# Patient Record
Sex: FEMALE | NOT HISPANIC OR LATINO | Employment: FULL TIME | URBAN - METROPOLITAN AREA
[De-identification: names, ages, dates, MRNs, and addresses within clinical notes are randomized per-mention and may not be internally consistent; named-entity substitution may affect disease eponyms.]

---

## 2017-01-12 DIAGNOSIS — M54.40 CHRONIC LOW BACK PAIN WITH SCIATICA, SCIATICA LATERALITY UNSPECIFIED, UNSPECIFIED BACK PAIN LATERALITY: ICD-10-CM

## 2017-01-12 DIAGNOSIS — G89.29 CHRONIC LOW BACK PAIN WITH SCIATICA, SCIATICA LATERALITY UNSPECIFIED, UNSPECIFIED BACK PAIN LATERALITY: ICD-10-CM

## 2017-01-12 RX ORDER — HYDROCODONE BITARTRATE AND ACETAMINOPHEN 10; 325 MG/1; MG/1
1 TABLET ORAL EVERY 8 HOURS PRN
Qty: 45 TAB | Refills: 0 | OUTPATIENT
Start: 2017-01-12

## 2017-01-13 ENCOUNTER — OFFICE VISIT (OUTPATIENT)
Dept: MEDICAL GROUP | Facility: MEDICAL CENTER | Age: 62
End: 2017-01-13
Attending: FAMILY MEDICINE
Payer: MEDICAID

## 2017-01-13 VITALS
TEMPERATURE: 98.9 F | DIASTOLIC BLOOD PRESSURE: 80 MMHG | HEART RATE: 92 BPM | WEIGHT: 190 LBS | OXYGEN SATURATION: 94 % | BODY MASS INDEX: 32.44 KG/M2 | HEIGHT: 64 IN | SYSTOLIC BLOOD PRESSURE: 132 MMHG | RESPIRATION RATE: 16 BRPM

## 2017-01-13 DIAGNOSIS — G89.29 CHRONIC LOW BACK PAIN WITHOUT SCIATICA, UNSPECIFIED BACK PAIN LATERALITY: ICD-10-CM

## 2017-01-13 DIAGNOSIS — I10 ESSENTIAL HYPERTENSION: ICD-10-CM

## 2017-01-13 DIAGNOSIS — Z12.11 SCREEN FOR COLON CANCER: ICD-10-CM

## 2017-01-13 DIAGNOSIS — M54.40 CHRONIC LOW BACK PAIN WITH SCIATICA, SCIATICA LATERALITY UNSPECIFIED, UNSPECIFIED BACK PAIN LATERALITY: ICD-10-CM

## 2017-01-13 DIAGNOSIS — R06.02 SHORTNESS OF BREATH: ICD-10-CM

## 2017-01-13 DIAGNOSIS — E66.9 OBESITY (BMI 30-39.9): ICD-10-CM

## 2017-01-13 DIAGNOSIS — Z12.31 ENCOUNTER FOR SCREENING MAMMOGRAM FOR MALIGNANT NEOPLASM OF BREAST: ICD-10-CM

## 2017-01-13 DIAGNOSIS — F41.9 ANXIETY: ICD-10-CM

## 2017-01-13 DIAGNOSIS — M54.50 CHRONIC LOW BACK PAIN WITHOUT SCIATICA, UNSPECIFIED BACK PAIN LATERALITY: ICD-10-CM

## 2017-01-13 DIAGNOSIS — G89.29 CHRONIC LOW BACK PAIN WITH SCIATICA, SCIATICA LATERALITY UNSPECIFIED, UNSPECIFIED BACK PAIN LATERALITY: ICD-10-CM

## 2017-01-13 PROCEDURE — 99213 OFFICE O/P EST LOW 20 MIN: CPT | Performed by: FAMILY MEDICINE

## 2017-01-13 PROCEDURE — 99214 OFFICE O/P EST MOD 30 MIN: CPT | Performed by: FAMILY MEDICINE

## 2017-01-13 RX ORDER — HYDROCODONE BITARTRATE AND ACETAMINOPHEN 10; 325 MG/1; MG/1
1 TABLET ORAL EVERY 8 HOURS PRN
Qty: 45 TAB | Refills: 0 | Status: SHIPPED | OUTPATIENT
Start: 2017-01-13 | End: 2017-02-17 | Stop reason: SDUPTHER

## 2017-01-13 ASSESSMENT — PATIENT HEALTH QUESTIONNAIRE - PHQ9: CLINICAL INTERPRETATION OF PHQ2 SCORE: 0

## 2017-01-13 NOTE — MR AVS SNAPSHOT
"        Susan Pastrana   2017 1:50 PM   Office Visit   MRN: [id number]    Department:  [facility]   Dept Phone:  [phone]    Description:  Female : 1955   Provider:  Yoan Valenzuela M.D.           Reason for Visit     Back Pain           Allergies as of 2017     No Known Allergies      You were diagnosed with     Obesity (BMI 30-39.9)   [996743]       Screen for colon cancer   [701000]       Encounter for screening mammogram for malignant neoplasm of breast   [606558]       Chronic low back pain without sciatica, unspecified back pain laterality   [5761579]       Essential hypertension   [3152981]       Shortness of breath   [786.05.ICD-9-CM]       Chronic low back pain with sciatica, sciatica laterality unspecified, unspecified back pain laterality   [8195229]         Vital Signs     Blood Pressure Pulse Temperature Respirations Height Weight    132/80 mmHg 92 37.2 °C (98.9 °F) 16 1.626 m (5' 4.02\") 86.183 kg (190 lb)    Body Mass Index Oxygen Saturation Last Menstrual Period Breastfeeding? Smoking Status       32.60 kg/m2 94% 2007 No Current Every Day Smoker       Basic Information     Date Of Birth Sex Race Ethnicity Preferred Language    1955 Female Unknown Non- English      Your appointments     2017  1:50 PM   Established Patient with Yoan Valenzuela M.D.   [facility] ([facility])    [address] [place] [id number]   [phone]           You will be receiving a confirmation call a few days before your appointment from our automated call confirmation system.              Problem List              ICD-10-CM Priority Class Noted - Resolved    Anxiety F41.9   10/21/2014 - Present    Acute right flank pain R10.9   11/3/2014 - Present    Elevated liver enzymes R74.8   2014 - Present    Fatty liver K76.0   2014 - Present    Chronic low back pain M54.5, G89.29   2014 - Present    Essential hypertension I10   2015 - " Present    Dyspnea R06.00   9/9/2016 - Present      Health Maintenance        Date Due Completion Dates    IMM DTaP/Tdap/Td Vaccine (1 - Tdap) 4/18/1976 ---    PAP SMEAR 4/18/1978 ---    MAMMOGRAM 4/18/1997 ---    COLONOSCOPY 4/18/2007 ---            Current Immunizations     Influenza Vaccine Quad Inj (Pf) 12/8/2015    Influenza Vaccine Quad Inj (Preserved) 10/13/2016    Tuberculin Skin Test 10/13/2016      Below and/or attached are the medications your provider expects you to take. Review all of your home medications and newly ordered medications with your provider and/or pharmacist. Follow medication instructions as directed by your provider and/or pharmacist. Please keep your medication list with you and share with your provider. Update the information when medications are discontinued, doses are changed, or new medications (including over-the-counter products) are added; and carry medication information at all times in the event of emergency situations     Allergies:  No Known Allergies          Medications  Valid as of: January 13, 2017 -  3:03 PM    Generic Name Brand Name Tablet Size Instructions for use    Albuterol Sulfate (Aero Soln) VENTOLIN  (90 BASE) MCG/ACT INHALE 2 PUFFS ORALLY EVERY 6 HOURS IF NEEDED FOR SHORTNESS OF BREATH        Chlorothiazide (Tab) DIURIL 500 MG TAKE 1 TABLET ORALLY EVERY MORNING        Cyclobenzaprine HCl (Tab) FLEXERIL 10 MG Take 1 Tab by mouth 3 times a day as needed. PAIN        Cyclobenzaprine HCl (Tab) FLEXERIL 10 MG TAKE 1 TABLET ORALLY 3 TIMES DAILY IF NEEDED FOR PAIN        Hydrocodone-Acetaminophen (Tab) NORCO  MG Take 1 Tab by mouth every 8 hours as needed.        Metoprolol Succinate (TABLET SR 24 HR) TOPROL XL 25 MG TAKE 1 TABLET ORALLY ONCE DAILY        Sertraline HCl (Tab) ZOLOFT 50 MG Take 1 Tab by mouth every day.        SUMAtriptan Succinate (Tab) IMITREX 50 MG Take 1 Tab by mouth Once PRN for Migraine.        Umeclidinium-Vilanterol (AEROSOL  POWDER, BREATH ACTIVATED) Umeclidinium-Vilanterol 62.5-25 MCG/INH Inhale 1 Puff by mouth every day.        .                 Medicines prescribed today were sent to:     [facility] - [place], [place] - [address]    [address] [facility] [place] [address]    Phone: [phone] Fax: [phone]    Open 24 Hours?: No    [facility] - [place], [place] - [address]    [address] [place] [address]    Phone: [phone] Fax: [phone]    Open 24 Hours?: No      Medication refill instructions:       If your prescription bottle indicates you have medication refills left, it is not necessary to call your provider’s office. Please contact your pharmacy and they will refill your medication.    If your prescription bottle indicates you do not have any refills left, you may request refills at any time through one of the following ways: The online [employer] system (except Urgent Care), by calling your provider’s office, or by asking your pharmacy to contact your provider’s office with a refill request. Medication refills are processed only during regular business hours and may not be available until the next business day. Your provider may request additional information or to have a follow-up visit with you prior to refilling your medication.   *Please Note: Medication refills are assigned a new Rx number when refilled electronically. Your pharmacy may indicate that no refills were authorized even though a new prescription for the same medication is available at the pharmacy. Please request the medicine by name with the pharmacy before contacting your provider for a refill.        Referral     A referral request has been sent to our patient care coordination department. Please allow 3-5 business days for us to process this request and contact you either by phone or mail. If you do not hear from us by the 5th business day, please call us at [phone].           [employer] Access Code:  [email or web address]  Expires: 2/12/2017  3:03 PM    [facility]  A secure, online tool to manage your health information     [employer] [facility]® is a secure, online tool that connects you to your personalized health information from the privacy of your home -- day or night - making it very easy for you to manage your healthcare. Once the activation process is completed, you can even access your medical information using the [facility] leodan, which is available for free in the Apple Leodan store or Google Play store.     [facility] provides the following levels of access (as shown below):   My Chart Features   [facility] Primary Care Doctor [facility]  Specialists [facility]  Urgent  Care Non-[facility]  Primary Care  Doctor   Email your healthcare team securely and privately 24/7 X X X    Manage appointments: schedule your next appointment; view details of past/upcoming appointments X      Request prescription refills. X      View recent personal medical records, including lab and immunizations X X X X   View health record, including health history, allergies, medications X X X X   Read reports about your outpatient visits, procedures, consult and ER notes X X X X   See your discharge summary, which is a recap of your hospital and/or ER visit that includes your diagnosis, lab results, and care plan. X X       How to register for [facility]:  1. Go to  [email or web address].  2. Click on the Sign Up Now box, which takes you to the New Member Sign Up page. You will need to provide the following information:  a. Enter your [facility] Access Code exactly as it appears at the top of this page. (You will not need to use this code after you’ve completed the sign-up process. If you do not sign up before the expiration date, you must request a new code.)   b. Enter your date of birth.   c. Enter your home email address.   d. Click Submit, and follow the next screen’s instructions.  3. Create a [facility] ID. This will be your [facility] login ID and cannot be  changed, so think of one that is secure and easy to remember.  4. Create a [employer] password. You can change your password at any time.  5. Enter your Password Reset Question and Answer. This can be used at a later time if you forget your password.   6. Enter your e-mail address. This allows you to receive e-mail notifications when new information is available in [facility].  7. Click Sign Up. You can now view your health information.    For assistance activating your [employer] account, call [phone]

## 2017-01-14 NOTE — ASSESSMENT & PLAN NOTE
Patient notes recurrent episodes of low back pain typically prompting her to take at least one Norco 10 mg daily, on occasion too. She is not currently doing any significant lifting. She reports no radiation of pain down either leg and no urinary or fecal incontinence.

## 2017-01-14 NOTE — ASSESSMENT & PLAN NOTE
Patient notes increased feelings of anxiety and intermittent depression recently. She reports she has been takingProzac 20 mg recently increased to 40 mg but recently without as much benefit as she enjoyed in the past. She denies suicidal ideation or near syncope.

## 2017-01-14 NOTE — ASSESSMENT & PLAN NOTE
Patient notes more frequent wheezes each morning as she wakes up and at times at bedtime as well. She tried Spiriva for 2 weeks but noticed that it gave her persistent pressure feeling in her bladder area which subsided quickly after she stopped the Spiriva. She denies any wheezes, and only has a rare dry cough.

## 2017-01-14 NOTE — PROGRESS NOTES
Chief Complaint   Patient presents with   • Back Pain       HISTORY OF PRESENT ILLNESS: Patient is a 61 y.o. female established patient who presents today to follow-up on anxiety/depression, worsening dyspnea, persistent lumbar pain        Anxiety  Patient notes increased feelings of anxiety and intermittent depression recently. She reports she has been takingProzac 20 mg recently increased to 40 mg but recently without as much benefit as she enjoyed in the past. She denies suicidal ideation or near syncope.    Dyspnea  Patient notes more frequent wheezes each morning as she wakes up and at times at bedtime as well. She tried Spiriva for 2 weeks but noticed that it gave her persistent pressure feeling in her bladder area which subsided quickly after she stopped the Spiriva. She denies any wheezes, and only has a rare dry cough.    Chronic low back pain  Patient notes recurrent episodes of low back pain typically prompting her to take at least one Norco 10 mg daily, on occasion too. She is not currently doing any significant lifting. She reports no radiation of pain down either leg and no urinary or fecal incontinence.    Obesity   Patient admits she has been overeating through the holidays. She denies any hair loss or cold intolerance.  Patient Active Problem List    Diagnosis Date Noted   • Dyspnea 09/09/2016   • Essential hypertension 12/08/2015   • Elevated liver enzymes 12/01/2014   • Fatty liver 12/01/2014   • Chronic low back pain 12/01/2014   • Acute right flank pain 11/03/2014   • Anxiety 10/21/2014       Allergies:Review of patient's allergies indicates no known allergies.    Current Outpatient Prescriptions   Medication Sig Dispense Refill   • Umeclidinium-Vilanterol (ANORO ELLIPTA) 62.5-25 MCG/INH AEROSOL POWDER, BREATH ACTIVATED Inhale 1 Puff by mouth every day. 1 Each 6   • sertraline (ZOLOFT) 50 MG Tab Take 1 Tab by mouth every day. 30 Tab 11   • hydrocodone/acetaminophen (NORCO)  MG Tab Take 1  "Tab by mouth every 8 hours as needed. 45 Tab 0   • metoprolol SR (TOPROL XL) 25 MG TABLET SR 24 HR TAKE 1 TABLET ORALLY ONCE DAILY 30 Tab 10   • VENTOLIN  (90 BASE) MCG/ACT Aero Soln inhalation aerosol INHALE 2 PUFFS ORALLY EVERY 6 HOURS IF NEEDED FOR SHORTNESS OF BREATH 1 Inhaler 2   • chlorothiazide (DIURIL) 500 MG tablet TAKE 1 TABLET ORALLY EVERY MORNING 30 Tab 6   • cyclobenzaprine (FLEXERIL) 10 MG Tab TAKE 1 TABLET ORALLY 3 TIMES DAILY IF NEEDED FOR PAIN 60 Tab 6   • cyclobenzaprine (FLEXERIL) 10 MG Tab Take 1 Tab by mouth 3 times a day as needed. PAIN 60 Tab 4   • sumatriptan (IMITREX) 50 MG TABS Take 1 Tab by mouth Once PRN for Migraine. 5 Each 11     No current facility-administered medications for this visit.    Social History-, not working    Social History   Substance Use Topics   • Smoking status: Current Every Day Smoker -- 0.25 packs/day     Types: Cigarettes   • Smokeless tobacco: Never Used   • Alcohol Use: 0.0 oz/week     0 Standard drinks or equivalent per week      Comment: occ       Family History   Problem Relation Age of Onset   • Cancer Mother      stomach   • Diabetes Father    • Cancer Father      prostate   • Diabetes Paternal Grandmother    • Heart Disease Paternal Grandmother    • Stroke Neg Hx        ROS:  Review of Systems   Constitutional: Negative for fever, chills, weight loss and malaise/fatigue.   Eyes: Negative for blurred vision.   Respiratory: Negative for cough, sputum production, shortness of breath and wheezing.    Cardiovascular: Negative for chest pain, palpitations, orthopnea and leg swelling.   Gastrointestinal: Negative for heartburn, nausea, vomiting and abdominal pain. .   Endo/Heme/Allergies: Does not bruise/bleed easily.               Exam:  Blood pressure 132/80, pulse 92, temperature 37.2 °C (98.9 °F), resp. rate 16, height 1.626 m (5' 4.02\"), weight 86.183 kg (190 lb), last menstrual period 09/13/2007, SpO2 94 %, not currently " breastfeeding.  General:  Well nourished, well developed female in NAD  Head is grossly normal.  Neck: Supple without JVD or bruit. Thyroid is not enlarged. Trachea is midline.  Pulmonary: Clear to ausculation .  Normal effort. No rales, ronchi, or wheezing.  Cardiovascular: Regular rate and rhythm without murmur.  Abdomen-Abdomen is soft, normal bowel sounds, no masses, guarding, ororganomegaly, or tenderness.  Lower extremities- neg for pretibial edema, redness, tenderness. Negative straight leg raising in the sitting position.   Psych-normal affect with good eye contact. Normal grooming. Oriented x4.      Please note that this dictation was created using voice recognition software. I have made every reasonable attempt to correct obvious errors, but I expect that there are errors of grammar and possibly content that I did not discover before finalizing the note.    Assessment/Plan:  1. Obesity (BMI 30-39.9)  Patient identified as having weight management issue.  Appropriate orders and counseling given.   2. Screen for colon cancer  REFERRAL TO GI FOR COLONOSCOPY   3. Encounter for screening mammogram for malignant neoplasm of breast  MA-SCREEN MAMMO W/CAD-BILAT   4. Chronic low back pain without sciatica, unspecified back pain laterality     5. Essential hypertension     6. Shortness of breath     7. Chronic low back pain with sciatica, sciatica laterality unspecified, unspecified back pain laterality  hydrocodone/acetaminophen (NORCO)  MG Tab   8. Anxiety        Plan: 1. Taper off fluoxetine 40 mg over the next 1 week.  2. Begin sertraline 50 mg daily at bedtime in 1 week  3. Trial of Anoro 1 puff daily in place of Spiriva which has been discontinued  4. Renew Norco, 10 mg, #45  5. Revisit in one month

## 2017-02-10 DIAGNOSIS — G89.29 CHRONIC LOW BACK PAIN WITH SCIATICA, SCIATICA LATERALITY UNSPECIFIED, UNSPECIFIED BACK PAIN LATERALITY: ICD-10-CM

## 2017-02-10 DIAGNOSIS — M54.40 CHRONIC LOW BACK PAIN WITH SCIATICA, SCIATICA LATERALITY UNSPECIFIED, UNSPECIFIED BACK PAIN LATERALITY: ICD-10-CM

## 2017-02-10 RX ORDER — HYDROCODONE BITARTRATE AND ACETAMINOPHEN 10; 325 MG/1; MG/1
1 TABLET ORAL EVERY 8 HOURS PRN
Qty: 45 TAB | Refills: 0 | OUTPATIENT
Start: 2017-02-10

## 2017-02-13 DIAGNOSIS — M54.40 CHRONIC LOW BACK PAIN WITH SCIATICA, SCIATICA LATERALITY UNSPECIFIED, UNSPECIFIED BACK PAIN LATERALITY: ICD-10-CM

## 2017-02-13 DIAGNOSIS — G89.29 CHRONIC LOW BACK PAIN WITH SCIATICA, SCIATICA LATERALITY UNSPECIFIED, UNSPECIFIED BACK PAIN LATERALITY: ICD-10-CM

## 2017-02-13 RX ORDER — HYDROCODONE BITARTRATE AND ACETAMINOPHEN 10; 325 MG/1; MG/1
1 TABLET ORAL EVERY 8 HOURS PRN
Qty: 45 TAB | Refills: 0 | OUTPATIENT
Start: 2017-02-13

## 2017-02-13 RX ORDER — FLUOXETINE HYDROCHLORIDE 20 MG/1
CAPSULE ORAL
Qty: 60 CAP | Refills: 1 | Status: SHIPPED | OUTPATIENT
Start: 2017-02-13 | End: 2017-02-17

## 2017-02-14 NOTE — TELEPHONE ENCOUNTER
Pt had an appointment scheduled for today, she had to cancel due to an appointment regarding a possible job. She wants to reschedule after she knows what her schedule will be.     Was the patient seen in the last year in this department? Yes     Does patient have an active prescription for medications requested? No     Received Request Via: Pharmacy

## 2017-02-17 ENCOUNTER — HOSPITAL ENCOUNTER (OUTPATIENT)
Facility: MEDICAL CENTER | Age: 62
End: 2017-02-17
Attending: FAMILY MEDICINE
Payer: MEDICAID

## 2017-02-17 ENCOUNTER — OFFICE VISIT (OUTPATIENT)
Dept: MEDICAL GROUP | Facility: MEDICAL CENTER | Age: 62
End: 2017-02-17
Attending: FAMILY MEDICINE
Payer: MEDICAID

## 2017-02-17 VITALS
RESPIRATION RATE: 16 BRPM | OXYGEN SATURATION: 89 % | DIASTOLIC BLOOD PRESSURE: 78 MMHG | HEIGHT: 64 IN | WEIGHT: 189 LBS | SYSTOLIC BLOOD PRESSURE: 126 MMHG | BODY MASS INDEX: 32.27 KG/M2 | TEMPERATURE: 98.8 F | HEART RATE: 100 BPM

## 2017-02-17 DIAGNOSIS — F41.9 ANXIETY: ICD-10-CM

## 2017-02-17 DIAGNOSIS — M54.40 CHRONIC LOW BACK PAIN WITH SCIATICA, SCIATICA LATERALITY UNSPECIFIED, UNSPECIFIED BACK PAIN LATERALITY: ICD-10-CM

## 2017-02-17 DIAGNOSIS — Z12.31 ENCOUNTER FOR SCREENING MAMMOGRAM FOR MALIGNANT NEOPLASM OF BREAST: ICD-10-CM

## 2017-02-17 DIAGNOSIS — G89.29 CHRONIC LOW BACK PAIN WITH SCIATICA, SCIATICA LATERALITY UNSPECIFIED, UNSPECIFIED BACK PAIN LATERALITY: ICD-10-CM

## 2017-02-17 DIAGNOSIS — Z79.891 CHRONIC USE OF OPIATE DRUGS THERAPEUTIC PURPOSES: ICD-10-CM

## 2017-02-17 DIAGNOSIS — J44.9 CHRONIC OBSTRUCTIVE PULMONARY DISEASE, UNSPECIFIED COPD TYPE (HCC): ICD-10-CM

## 2017-02-17 PROCEDURE — 99214 OFFICE O/P EST MOD 30 MIN: CPT | Performed by: FAMILY MEDICINE

## 2017-02-17 PROCEDURE — G0480 DRUG TEST DEF 1-7 CLASSES: HCPCS

## 2017-02-17 PROCEDURE — 80307 DRUG TEST PRSMV CHEM ANLYZR: CPT

## 2017-02-17 RX ORDER — SERTRALINE HYDROCHLORIDE 100 MG/1
100 TABLET, FILM COATED ORAL DAILY
Qty: 30 TAB | Refills: 6 | Status: SHIPPED | OUTPATIENT
Start: 2017-02-17 | End: 2017-03-17

## 2017-02-17 RX ORDER — ALBUTEROL SULFATE 90 UG/1
AEROSOL, METERED RESPIRATORY (INHALATION)
Refills: 1 | Status: CANCELLED | OUTPATIENT
Start: 2017-02-17

## 2017-02-17 RX ORDER — HYDROCODONE BITARTRATE AND ACETAMINOPHEN 10; 325 MG/1; MG/1
1 TABLET ORAL EVERY 8 HOURS PRN
Qty: 45 TAB | Refills: 0 | Status: SHIPPED | OUTPATIENT
Start: 2017-02-17 | End: 2017-03-17 | Stop reason: SDUPTHER

## 2017-02-17 RX ORDER — ALBUTEROL SULFATE 90 UG/1
AEROSOL, METERED RESPIRATORY (INHALATION)
Qty: 1 INHALER | Refills: 5 | Status: SHIPPED | OUTPATIENT
Start: 2017-02-17 | End: 2017-08-21 | Stop reason: SDUPTHER

## 2017-02-17 ASSESSMENT — ENCOUNTER SYMPTOMS: DEPRESSION: 0

## 2017-02-17 ASSESSMENT — LIFESTYLE VARIABLES: HISTORY_ALCOHOL_USE: 0

## 2017-02-17 NOTE — MR AVS SNAPSHOT
"        Susan Pastrana   2017 4:50 PM   Office Visit   MRN: [id number]    [facility]   Dept Phone:  [phone]    Description:  Female : 1955   Provider:  Yoan Valenzuela M.D.           Reason for Visit     Back Pain           Allergies as of 2017     No Known Allergies      You were diagnosed with     Encounter for screening mammogram for malignant neoplasm of breast   [038341]       Chronic use of opiate drugs therapeutic purposes   [5037777]       Chronic low back pain with sciatica, sciatica laterality unspecified, unspecified back pain laterality   [[id number]]         Vital Signs     Blood Pressure Pulse Temperature Respirations Height Weight    126/78 mmHg 100 37.1 °C (98.8 °F) 16 1.626 m (5' 4.02\") 85.73 kg (189 lb)    Body Mass Index Oxygen Saturation Last Menstrual Period Breastfeeding? Smoking Status       32.43 kg/m2 89% 2007 No Current Every Day Smoker       Basic Information     Date Of Birth Sex Race Ethnicity Preferred Language    1955 Female Unknown Non- English      Problem List              ICD-10-CM Priority Class Noted - Resolved    Anxiety F41.9   10/21/2014 - Present    Acute right flank pain R10.9   11/3/2014 - Present    Elevated liver enzymes R74.8   2014 - Present    Fatty liver K76.0   2014 - Present    Chronic low back pain M54.5, G89.29   2014 - Present    Essential hypertension I10   2015 - Present    Dyspnea R06.00   2016 - Present    Chronic use of opiate drugs therapeutic purposes Z79.899   2017 - Present      Health Maintenance        Date Due Completion Dates    IMM DTaP/Tdap/Td Vaccine (1 - Tdap) 1976 ---    PAP SMEAR 1978 ---    MAMMOGRAM 1997 ---    COLONOSCOPY 2007 ---            Current Immunizations     Influenza Vaccine Quad Inj (Pf) 2015    Influenza Vaccine Quad Inj (Preserved) 10/13/2016    Tuberculin Skin Test 10/13/2016      Below and/or attached are the " medications your provider expects you to take. Review all of your home medications and newly ordered medications with your provider and/or pharmacist. Follow medication instructions as directed by your provider and/or pharmacist. Please keep your medication list with you and share with your provider. Update the information when medications are discontinued, doses are changed, or new medications (including over-the-counter products) are added; and carry medication information at all times in the event of emergency situations     Allergies:  No Known Allergies          Medications  Valid as of: February 17, 2017 -  5:41 PM    Generic Name Brand Name Tablet Size Instructions for use    Albuterol Sulfate (Aero Soln) albuterol 108 (90 BASE) MCG/ACT INHALE 2 PUFFS ORALLY EVERY 6 HOURS IF NEEDED FOR SHORTNESS OF BREATH        Chlorothiazide (Tab) DIURIL 500 MG TAKE 1 TABLET ORALLY EVERY MORNING        Cyclobenzaprine HCl (Tab) FLEXERIL 10 MG Take 1 Tab by mouth 3 times a day as needed. PAIN        Cyclobenzaprine HCl (Tab) FLEXERIL 10 MG TAKE 1 TABLET ORALLY 3 TIMES DAILY IF NEEDED FOR PAIN        Hydrocodone-Acetaminophen (Tab) NORCO  MG Take 1 Tab by mouth every 8 hours as needed.        Metoprolol Succinate (TABLET SR 24 HR) TOPROL XL 25 MG TAKE 1 TABLET ORALLY ONCE DAILY        Sertraline HCl (Tab) ZOLOFT 50 MG Take 1 Tab by mouth every day.        Sertraline HCl (Tab) ZOLOFT 100 MG Take 1 Tab by mouth every day.        SUMAtriptan Succinate (Tab) IMITREX 50 MG Take 1 Tab by mouth Once PRN for Migraine.        Umeclidinium-Vilanterol (AEROSOL POWDER, BREATH ACTIVATED) Umeclidinium-Vilanterol 62.5-25 MCG/INH Inhale 1 Puff by mouth every day.        .                 Medicines prescribed today were sent to:     [facility]  [place] - [address] [facility].    [address] [place] [id number]    Phone: [phone] Fax: [phone]    Open 24 Hours?: No    [facility] [place] [address]    [address] [place] [address]    Phone: [phone] Fax: [phone]    Open 24 Hours?: No      Medication refill instructions:       If your prescription bottle indicates you have medication refills left, it is not necessary to call your provider’s office. Please contact your pharmacy and they will refill your medication.    If your prescription bottle indicates you do not have any refills left, you may request refills at any time through one of the following ways: The online Pebble system (except Urgent Care), by calling your provider’s office, or by asking your pharmacy to contact your provider’s office with a refill request. Medication refills are processed only during regular business hours and may not be available until the next business day. Your provider may request additional information or to have a follow-up visit with you prior to refilling your medication.   *Please Note: Medication refills are assigned a new Rx number when refilled electronically. Your pharmacy may indicate that no refills were authorized even though a new prescription for the same medication is available at the pharmacy. Please request the medicine by name with the pharmacy before contacting your provider for a refill.        Your To Do List     Future Labs/Procedures Complete By Expires    PAIN MANAGEMENT PANEL, SCRN W/ RFLX TO QNT  As directed 2/17/2018    Comments:    Current Meds (name, sig, last dose):   Current outpatient prescriptions:   •  fluoxetine, TAKE 2 CAPSULES ORALLY ONCE DAILY  •  Umeclidinium-Vilanterol, 1 Puff, Inhalation, DAILY  •  sertraline, 50 mg, Oral, DAILY  •  hydrocodone/acetaminophen, 1 Tab, Oral, Q8HRS PRN  •  metoprolol SR, TAKE 1 TABLET ORALLY ONCE DAILY  •  VENTOLIN HFA, INHALE 2 PUFFS ORALLY EVERY 6 HOURS IF NEEDED FOR SHORTNESS OF BREATH  •  chlorothiazide, TAKE 1 TABLET ORALLY EVERY MORNING  •  cyclobenzaprine, TAKE 1 TABLET ORALLY 3 TIMES DAILY IF NEEDED FOR PAIN  •   cyclobenzaprine, 10 mg, Oral, TID PRN  •  sumatriptan, 50 mg, Oral, Once PRN, prn               WiTricity Access Code: [email or web address]  Expires: 3/19/2017  5:41 PM    WiTricity  A secure, online tool to manage your health information     [employer] WiTricity® is a secure, online tool that connects you to your personalized health information from the privacy of your home -- day or night - making it very easy for you to manage your healthcare. Once the activation process is completed, you can even access your medical information using the WiTricity leodan, which is available for free in the Apple Leodan store or Google Play store.     WiTricity provides the following levels of access (as shown below):   My Chart Features   [facility] Doctor [facility]  Specialists [facility]  Urgent  Care Non-[facility]  Primary Care  Doctor   Email your healthcare team securely and privately 24/7 X X X    Manage appointments: schedule your next appointment; view details of past/upcoming appointments X      Request prescription refills. X      View recent personal medical records, including lab and immunizations X X X X   View health record, including health history, allergies, medications X X X X   Read reports about your outpatient visits, procedures, consult and ER notes X X X X   See your discharge summary, which is a recap of your hospital and/or ER visit that includes your diagnosis, lab results, and care plan. X X       How to register for WiTricity:  1. Go to  [email or web address].  2. Click on the Sign Up Now box, which takes you to the New Member Sign Up page. You will need to provide the following information:  a. Enter your WiTricity Access Code exactly as it appears at the top of this page. (You will not need to use this code after you’ve completed the sign-up process. If you do not sign up before the expiration date, you must request a new code.)   b. Enter your date of birth.   c. Enter your home email address.   d. Click Submit,  and follow the next screen’s instructions.  3. Create a iNeoMarketing ID. This will be your iNeoMarketing login ID and cannot be changed, so think of one that is secure and easy to remember.  4. Create a [employer] password. You can change your password at any time.  5. Enter your Password Reset Question and Answer. This can be used at a later time if you forget your password.   6. Enter your e-mail address. This allows you to receive e-mail notifications when new information is available in iNeoMarketing.  7. Click Sign Up. You can now view your health information.    For assistance activating your iNeoMarketing account, call [phone]

## 2017-02-18 NOTE — ASSESSMENT & PLAN NOTE
Patient misunderstood and has continued 20 mg of fluoxetine daily along with 50 mg of sertraline beginning 3 weeks ago. She reports that she's noticed some mild lightheadedness and does not feel any real improvement with regard to anxiety. She denies suicidal ideation or tearfulness. Nose anxiety associated with job hunting and interviews.

## 2017-02-18 NOTE — ASSESSMENT & PLAN NOTE
Patient is currently trying to find work. She reports intermittent low back pain typically occurring on most days particularly if she spends more time up and walking. She is using Norco 10 mg 1-1/2 tablets per day on average. She denies constipation, urinary incontinence, fecal incontinence

## 2017-02-18 NOTE — ASSESSMENT & PLAN NOTE
She reports she has done well on starting with Anoro inhaler last month. She does not have the bladder/genital pressure/irritation that she reported linked with the Spiriva. Reports that she notes significantly less wheezing during nights and early mornings. She denies current cough or sputum production. She does smoke quarter pack of cigarettes per day.

## 2017-02-18 NOTE — PROGRESS NOTES
Chief Complaint   Patient presents with   • Back Pain       HISTORY OF PRESENT ILLNESS: Patient is a 61 y.o. female established patient who presents today to follow-up on COPD, chronic low back pain, anxiety        COPD (chronic obstructive pulmonary disease) (CMS-HCC)  She reports she has done well on starting with Anoro inhaler last month. She does not have the bladder/genital pressure/irritation that she reported linked with the Spiriva. Reports that she notes significantly less wheezing during nights and early mornings. She denies current cough or sputum production. She does smoke quarter pack of cigarettes per day.    Chronic low back pain  Patient is currently trying to find work. She reports intermittent low back pain typically occurring on most days particularly if she spends more time up and walking. She is using Norco 10 mg 1-1/2 tablets per day on average. She denies constipation, urinary incontinence, fecal incontinence    Anxiety  Patient misunderstood and has continued 20 mg of fluoxetine daily along with 50 mg of sertraline beginning 3 weeks ago. She reports that she's noticed some mild lightheadedness and does not feel any real improvement with regard to anxiety. She denies suicidal ideation or tearfulness. Nose anxiety associated with job hunting and interviews.      Patient Active Problem List    Diagnosis Date Noted   • Chronic use of opiate drugs therapeutic purposes 02/17/2017   • COPD (chronic obstructive pulmonary disease) (CMS-HCC) 02/17/2017   • Essential hypertension 12/08/2015   • Elevated liver enzymes 12/01/2014   • Fatty liver 12/01/2014   • Chronic low back pain 12/01/2014   • Acute right flank pain 11/03/2014   • Anxiety 10/21/2014    Social History-single, not currently working    Allergies:Review of patient's allergies indicates no known allergies.    Current Outpatient Prescriptions   Medication Sig Dispense Refill   • hydrocodone/acetaminophen (NORCO)  MG Tab Take 1 Tab by  mouth every 8 hours as needed. 45 Tab 0   • sertraline (ZOLOFT) 100 MG Tab Take 1 Tab by mouth every day. 30 Tab 6   • albuterol (VENTOLIN HFA) 108 (90 BASE) MCG/ACT Aero Soln inhalation aerosol INHALE 2 PUFFS ORALLY EVERY 6 HOURS IF NEEDED FOR SHORTNESS OF BREATH 1 Inhaler 5   • Umeclidinium-Vilanterol (ANORO ELLIPTA) 62.5-25 MCG/INH AEROSOL POWDER, BREATH ACTIVATED Inhale 1 Puff by mouth every day. 1 Each 6   • sertraline (ZOLOFT) 50 MG Tab Take 1 Tab by mouth every day. 30 Tab 11   • metoprolol SR (TOPROL XL) 25 MG TABLET SR 24 HR TAKE 1 TABLET ORALLY ONCE DAILY 30 Tab 10   • chlorothiazide (DIURIL) 500 MG tablet TAKE 1 TABLET ORALLY EVERY MORNING 30 Tab 6   • cyclobenzaprine (FLEXERIL) 10 MG Tab TAKE 1 TABLET ORALLY 3 TIMES DAILY IF NEEDED FOR PAIN 60 Tab 6   • cyclobenzaprine (FLEXERIL) 10 MG Tab Take 1 Tab by mouth 3 times a day as needed. PAIN 60 Tab 4   • sumatriptan (IMITREX) 50 MG TABS Take 1 Tab by mouth Once PRN for Migraine. 5 Each 11     No current facility-administered medications for this visit.       Social History   Substance Use Topics   • Smoking status: Current Every Day Smoker -- 0.25 packs/day     Types: Cigarettes   • Smokeless tobacco: Never Used   • Alcohol Use: 0.0 oz/week     0 Standard drinks or equivalent per week      Comment: occ       Family History   Problem Relation Age of Onset   • Cancer Mother      stomach   • Diabetes Father    • Cancer Father      prostate   • Diabetes Paternal Grandmother    • Heart Disease Paternal Grandmother    • Stroke Neg Hx        ROS:  Review of Systems   Constitutional: Negative for fever, chills, weight loss and malaise/fatigue.   Eyes: Negative for blurred vision.    Cardiovascular: Negative for chest pain, palpitations, orthopnea and leg swelling.   Gastrointestinal: Negative for heartburn, nausea, vomiting and abdominal pain.   Endo/Heme/Allergies: Does not bruise/bleed easily.               Exam:  Blood pressure 126/78, pulse 100, temperature  "37.1 °C (98.8 °F), resp. rate 16, height 1.626 m (5' 4.02\"), weight 85.73 kg (189 lb), last menstrual period 09/13/2007, SpO2 89 %, not currently breastfeeding.  General:  Well nourished, well developed female in NAD  Head is grossly normal.  Neck: Supple without JVD or bruit. Thyroid is not enlarged. Trachea is midline.  Pulmonary: Clear to ausculation .  Normal effort. No rales, ronchi, or wheezing.  Cardiovascular: Regular rate and rhythm without murmur.  Abdomen-Abdomen is soft, normal bowel sounds, no masses, guarding, ororganomegaly, or tenderness.  Lower extremities- neg for pretibial edema, redness, tenderness.  Psych-normal affect with good eye contact. Normal grooming. Oriented x4.      Please note that this dictation was created using voice recognition software. I have made every reasonable attempt to correct obvious errors, but I expect that there are errors of grammar and possibly content that I did not discover before finalizing the note.    Assessment/Plan:  1. Encounter for screening mammogram for malignant neoplasm of breast  MA-SCREEN MAMMO W/CAD-BILAT   2. Chronic use of opiate drugs therapeutic purposes  Controlled Substance Treatment Agreement    PAIN MANAGEMENT PANEL, SCRN W/ RFLX TO QNT   3. Chronic low back pain with sciatica, sciatica laterality unspecified, unspecified back pain laterality  hydrocodone/acetaminophen (NORCO)  MG Tab   4. Anxiety     5. Chronic obstructive pulmonary disease, unspecified COPD type (CMS-HCC)        Plan: 1. After lengthy discussion patient agrees to schedule screening colonoscopy  2. Patient agrees to proceed with screening mammogram  3. Renew Norco 10 mg, #45  4. Update CSA, UDS  5. Trial of 100 mg sertraline with oblique discontinuation of fluoxetine  6. Revisit one month    "

## 2017-02-19 LAB
AMPHET CTO UR CFM-MCNC: NEGATIVE NG/ML
BARBITURATES CTO UR CFM-MCNC: NEGATIVE NG/ML
BENZODIAZ CTO UR CFM-MCNC: NEGATIVE NG/ML
BUPRENORPHINE UR-MCNC: NEGATIVE NG/ML
CANNABINOIDS CTO UR CFM-MCNC: POSITIVE NG/ML
CARISOPRODOL UR-MCNC: NEGATIVE NG/ML
COCAINE CTO UR CFM-MCNC: NEGATIVE NG/ML
DRUG SCREEN COMMENT UR-IMP: NORMAL
ETHYL GLUCURONIDE UR QL SCN: NEGATIVE NG/ML
FENTANYL UR-MCNC: NEGATIVE NG/ML
MEPERIDINE CTO UR SCN-MCNC: NEGATIVE NG/ML
METHADONE CTO UR CFM-MCNC: NEGATIVE NG/ML
OPIATES UR QL SCN: POSITIVE NG/ML
OXYCDOXYM URSCRN 2005102: NEGATIVE NG/ML
PCP CTO UR CFM-MCNC: NEGATIVE NG/ML
PROPOXYPH CTO UR CFM-MCNC: NEGATIVE NG/ML
TAPENTADOL UR-MCNC: NEGATIVE NG/ML
TRAMADOL CTO UR SCN-MCNC: NEGATIVE NG/ML
ZOLPIDEM UR-MCNC: NEGATIVE NG/ML

## 2017-02-21 LAB
6MAM UR CFM-MCNC: <10 NG/ML
CODEINE UR CFM-MCNC: <20 NG/ML
HYDROCODONE UR CFM-MCNC: >4000 NG/ML
HYDROMORPHONE UR CFM-MCNC: <20 NG/ML
MORPHINE UR CFM-MCNC: <20 NG/ML
NORHYDROCODONE UR CFM-MCNC: >4000 NG/ML
NOROXYCODONE UR CFM-MCNC: <20 NG/ML
OPIATES UR NOROXYM Q0836: <20 NG/ML
OXYCODONE UR CFM-MCNC: <20 NG/ML
OXYMORPHONE UR CFM-MCNC: <20 NG/ML
THC UR CFM-MCNC: 6 NG/ML

## 2017-03-17 ENCOUNTER — OFFICE VISIT (OUTPATIENT)
Dept: MEDICAL GROUP | Facility: MEDICAL CENTER | Age: 62
End: 2017-03-17
Attending: FAMILY MEDICINE
Payer: MEDICAID

## 2017-03-17 VITALS
SYSTOLIC BLOOD PRESSURE: 122 MMHG | RESPIRATION RATE: 16 BRPM | HEIGHT: 64 IN | HEART RATE: 88 BPM | TEMPERATURE: 98.5 F | DIASTOLIC BLOOD PRESSURE: 90 MMHG | BODY MASS INDEX: 32.2 KG/M2 | WEIGHT: 188.6 LBS | OXYGEN SATURATION: 92 %

## 2017-03-17 DIAGNOSIS — I10 ESSENTIAL HYPERTENSION: ICD-10-CM

## 2017-03-17 DIAGNOSIS — F41.9 ANXIETY: ICD-10-CM

## 2017-03-17 DIAGNOSIS — G89.29 CHRONIC LOW BACK PAIN WITH SCIATICA, SCIATICA LATERALITY UNSPECIFIED, UNSPECIFIED BACK PAIN LATERALITY: ICD-10-CM

## 2017-03-17 DIAGNOSIS — M54.40 CHRONIC LOW BACK PAIN WITH SCIATICA, SCIATICA LATERALITY UNSPECIFIED, UNSPECIFIED BACK PAIN LATERALITY: ICD-10-CM

## 2017-03-17 DIAGNOSIS — J44.9 CHRONIC OBSTRUCTIVE PULMONARY DISEASE, UNSPECIFIED COPD TYPE (HCC): ICD-10-CM

## 2017-03-17 PROCEDURE — 99214 OFFICE O/P EST MOD 30 MIN: CPT | Performed by: FAMILY MEDICINE

## 2017-03-17 PROCEDURE — 99213 OFFICE O/P EST LOW 20 MIN: CPT | Performed by: FAMILY MEDICINE

## 2017-03-17 RX ORDER — HYDROCODONE BITARTRATE AND ACETAMINOPHEN 10; 325 MG/1; MG/1
1 TABLET ORAL EVERY 8 HOURS PRN
Qty: 45 TAB | Refills: 0 | Status: SHIPPED | OUTPATIENT
Start: 2017-03-17 | End: 2017-04-14 | Stop reason: SDUPTHER

## 2017-03-17 RX ORDER — BUPROPION HYDROCHLORIDE 150 MG/1
150 TABLET ORAL EVERY MORNING
Qty: 30 TAB | Refills: 3 | Status: SHIPPED | OUTPATIENT
Start: 2017-03-17 | End: 2017-04-14

## 2017-03-17 NOTE — MR AVS SNAPSHOT
"        Susan Pastrana   3/17/2017 1:50 PM   Office Visit   MRN: [id number]    Department:  [facility]   Dept Phone:  [phone]    Description:  Female : 1955   Provider:  Yoan Valenzuela M.D.           Reason for Visit     Follow-Up           Allergies as of 3/17/2017     No Known Allergies      You were diagnosed with     Essential hypertension   [3863406]       Chronic obstructive pulmonary disease, unspecified COPD type (CMS-HCC)   [0530824]       Anxiety   [359717]       Chronic low back pain with sciatica, sciatica laterality unspecified, unspecified back pain laterality   [8043996]         Vital Signs     Blood Pressure Pulse Temperature Respirations Height Weight    122/90 mmHg 88 36.9 °C (98.5 °F) 16 1.626 m (5' 4.02\") 85.548 kg (188 lb 9.6 oz)    Body Mass Index Oxygen Saturation Last Menstrual Period Breastfeeding? Smoking Status       32.36 kg/m2 92% 2007 No Current Every Day Smoker       Basic Information     Date Of Birth Sex Race Ethnicity Preferred Language    1955 Female Unknown Non- English      Your appointments     2017  1:50 PM   Established Patient with Yoan Valenzuela M.D.   [facility] ([facility])    [address] [place] [id number] [phone]           You will be receiving a confirmation call a few days before your appointment from our automated call confirmation system.              Problem List              ICD-10-CM Priority Class Noted - Resolved    Anxiety F41.9   10/21/2014 - Present    Acute right flank pain R10.9   11/3/2014 - Present    Elevated liver enzymes R74.8   2014 - Present    Fatty liver K76.0   2014 - Present    Chronic low back pain M54.5, G89.29   2014 - Present    Essential hypertension I10   2015 - Present    Chronic use of opiate drugs therapeutic purposes Z79.899   2017 - Present    COPD (chronic obstructive pulmonary disease) (CMS-HCC) J44.9   2017 - Present      "   Health Maintenance        Date Due Completion Dates    IMM DTaP/Tdap/Td Vaccine (1 - Tdap) 4/18/1976 ---    IMM PNEUMOCOCCAL 19-64 (ADULT) MEDIUM RISK SERIES (1 of 1 - PPSV23) 4/18/1976 ---    PAP SMEAR 4/18/1978 ---    MAMMOGRAM 4/18/1997 ---    COLONOSCOPY 4/18/2007 ---            Current Immunizations     Influenza Vaccine Quad Inj (Pf) 12/8/2015    Influenza Vaccine Quad Inj (Preserved) 10/13/2016    Tuberculin Skin Test 10/13/2016      Below and/or attached are the medications your provider expects you to take. Review all of your home medications and newly ordered medications with your provider and/or pharmacist. Follow medication instructions as directed by your provider and/or pharmacist. Please keep your medication list with you and share with your provider. Update the information when medications are discontinued, doses are changed, or new medications (including over-the-counter products) are added; and carry medication information at all times in the event of emergency situations     Allergies:  No Known Allergies          Medications  Valid as of: March 17, 2017 -  1:58 PM    Generic Name Brand Name Tablet Size Instructions for use    Albuterol Sulfate (Aero Soln) albuterol 108 (90 BASE) MCG/ACT INHALE 2 PUFFS ORALLY EVERY 6 HOURS IF NEEDED FOR SHORTNESS OF BREATH        BuPROPion HCl (TABLET SR 24 HR) WELLBUTRIN  MG Take 1 Tab by mouth every morning.        Chlorothiazide (Tab) DIURIL 500 MG TAKE 1 TABLET ORALLY EVERY MORNING        Cyclobenzaprine HCl (Tab) FLEXERIL 10 MG Take 1 Tab by mouth 3 times a day as needed. PAIN        Cyclobenzaprine HCl (Tab) FLEXERIL 10 MG TAKE 1 TABLET ORALLY 3 TIMES DAILY IF NEEDED FOR PAIN        Hydrocodone-Acetaminophen (Tab) NORCO  MG Take 1 Tab by mouth every 8 hours as needed.        Metoprolol Succinate (TABLET SR 24 HR) TOPROL XL 25 MG TAKE 1 TABLET ORALLY ONCE DAILY        SUMAtriptan Succinate (Tab) IMITREX 50 MG Take 1 Tab by mouth Once PRN for  Migraine.        Umeclidinium-Vilanterol (AEROSOL POWDER, BREATH ACTIVATED) Umeclidinium-Vilanterol 62.5-25 MCG/INH Inhale 1 Puff by mouth every day.        .                 Medicines prescribed today were sent to:     [facility] - [place], [place] - [address]    [address] [place] [address]    Phone: [phone] Fax: [phone]    Open 24 Hours?: No    [facility] [place] - [address]    [address] [place] [address]    Phone: [phone] Fax: [phone]    Open 24 Hours?: No      Medication refill instructions:       If your prescription bottle indicates you have medication refills left, it is not necessary to call your provider’s office. Please contact your pharmacy and they will refill your medication.    If your prescription bottle indicates you do not have any refills left, you may request refills at any time through one of the following ways: The online MoboTap system (except Urgent Care), by calling your provider’s office, or by asking your pharmacy to contact your provider’s office with a refill request. Medication refills are processed only during regular business hours and may not be available until the next business day. Your provider may request additional information or to have a follow-up visit with you prior to refilling your medication.   *Please Note: Medication refills are assigned a new Rx number when refilled electronically. Your pharmacy may indicate that no refills were authorized even though a new prescription for the same medication is available at the pharmacy. Please request the medicine by name with the pharmacy before contacting your provider for a refill.           MoboTap Access Code: [email or web address]  Expires: 3/19/2017  6:41 PM    MoboTap  A secure, online tool to manage your health information     [email or web address]’s MoboTap® is a secure, online tool that connects you to your personalized health information from the privacy of your  home -- day or night - making it very easy for you to manage your healthcare. Once the activation process is completed, you can even access your medical information using the [employer] leodan, which is available for free in the Apple Leodan store or Google Play store.     [employer] provides the following levels of access (as shown below):   My Chart Features   [facility] Doctor [facility]  Specialists [facility]  Urgent  Care Non-[facility]  Primary Care  Doctor   Email your healthcare team securely and privately 24/7 X X X    Manage appointments: schedule your next appointment; view details of past/upcoming appointments X      Request prescription refills. X      View recent personal medical records, including lab and immunizations X X X X   View health record, including health history, allergies, medications X X X X   Read reports about your outpatient visits, procedures, consult and ER notes X X X X   See your discharge summary, which is a recap of your hospital and/or ER visit that includes your diagnosis, lab results, and care plan. X X       How to register for [employer]:  1. Go to  [email or web address].  2. Click on the Sign Up Now box, which takes you to the New Member Sign Up page. You will need to provide the following information:  a. Enter your [employer] Access Code exactly as it appears at the top of this page. (You will not need to use this code after you’ve completed the sign-up process. If you do not sign up before the expiration date, you must request a new code.)   b. Enter your date of birth.   c. Enter your home email address.   d. Click Submit, and follow the next screen’s instructions.  3. Create a [employer] ID. This will be your [employer] login ID and cannot be changed, so think of one that is secure and easy to remember.  4. Create a [employer] password. You can change your password at any time.  5. Enter your Password Reset Question and Answer. This can be used at a later time if you forget your password.    6. Enter your e-mail address. This allows you to receive e-mail notifications when new information is available in [employer].  7. Click Sign Up. You can now view your health information.    For assistance activating your [employer] account, call [phone]        Quit Tobacco Information     Do you want to quit using tobacco?    Quitting tobacco decreases risks of cancer, heart and lung disease, increases life expectancy, improves sense of taste and smell, and increases spending money, among other benefits.    If you are thinking about quitting, we can help.  • [facility] Quit Tobacco Program: [phone]  o Program occurs weekly for four weeks and includes pharmacist consultation on products to support quitting smoking or chewing tobacco. A provider referral is needed for pharmacist consultation.  • Tobacco Users Help Hotline: 3-800QUIT-NOW (990-7272) or https://[place].quitlogix.org/  o Free, confidential telephone and online coaching for [place] residents. Sessions are designed on a schedule that is convenient for you. Eligible clients receive free nicotine replacement therapy.  • Nationally: www.smokefree.gov  o Information and professional assistance to support both immediate and long-term needs as you become, and remain, a non-smoker. Smokefree.gov allows you to choose the help that best fits your needs.

## 2017-03-17 NOTE — ASSESSMENT & PLAN NOTE
No recent home blood pressure recordings. Patient is compliant taking metoprolol XL 25 mg along with chlorothiazide 500 mg daily. She is not reporting current chest pain, chest pressure or palpitations other than with one significant possible panic attack about 2-3 weeks ago.

## 2017-03-17 NOTE — PROGRESS NOTES
Chief Complaint   Patient presents with   • Follow-Up       HISTORY OF PRESENT ILLNESS: Patient is a 61 y.o. female established patient who presents today to follow-up on anxiety, COPD/tobacco cessation counseling, hypertension        Anxiety  Patient reports that she did not do well on Zoloft noting shortness of breath and episode on 1 day of severe palpitations and probably a panic episode. She discontinued the Zoloft after approximately 2 weeks from our last visit and returned back to her old standby 40 mg Prozac. She reports it is partially helpful but is hoping to find something that is more effective at reducing the level of her anxiety. She is not experiencing suicidal ideation, or tearfulness recently.    COPD (chronic obstructive pulmonary disease) (CMS-HCC)  Patient reports overall breathing is comfortable and seems to be improved using the Anoro. She is smoking 2-3 cigarettes per day but also using her vapor instrument breaks or several times per day. Describes minimal current cough and minimal sputum production.    Essential hypertension  No recent home blood pressure recordings. Patient is compliant taking metoprolol XL 25 mg along with chlorothiazide 500 mg daily. She is not reporting current chest pain, chest pressure or palpitations other than with one significant possible panic attack about 2-3 weeks ago.      Patient Active Problem List    Diagnosis Date Noted   • Chronic use of opiate drugs therapeutic purposes 02/17/2017   • COPD (chronic obstructive pulmonary disease) (CMS-HCC) 02/17/2017   • Essential hypertension 12/08/2015   • Elevated liver enzymes 12/01/2014   • Fatty liver 12/01/2014   • Chronic low back pain 12/01/2014   • Acute right flank pain 11/03/2014   • Anxiety 10/21/2014    Social History-single, working as a on-site caregiver    Allergies:Review of patient's allergies indicates no known allergies.    Current Outpatient Prescriptions   Medication Sig Dispense Refill   • buPROPion  (WELLBUTRIN XL) 150 MG XL tablet Take 1 Tab by mouth every morning. 30 Tab 3   • hydrocodone/acetaminophen (NORCO)  MG Tab Take 1 Tab by mouth every 8 hours as needed. 45 Tab 0   • chlorothiazide (DIURIL) 500 MG tablet TAKE 1 TABLET ORALLY EVERY MORNING 30 Tab 6   • albuterol (VENTOLIN HFA) 108 (90 BASE) MCG/ACT Aero Soln inhalation aerosol INHALE 2 PUFFS ORALLY EVERY 6 HOURS IF NEEDED FOR SHORTNESS OF BREATH 1 Inhaler 5   • Umeclidinium-Vilanterol (ANORO ELLIPTA) 62.5-25 MCG/INH AEROSOL POWDER, BREATH ACTIVATED Inhale 1 Puff by mouth every day. 1 Each 6   • metoprolol SR (TOPROL XL) 25 MG TABLET SR 24 HR TAKE 1 TABLET ORALLY ONCE DAILY 30 Tab 10   • cyclobenzaprine (FLEXERIL) 10 MG Tab TAKE 1 TABLET ORALLY 3 TIMES DAILY IF NEEDED FOR PAIN 60 Tab 6   • cyclobenzaprine (FLEXERIL) 10 MG Tab Take 1 Tab by mouth 3 times a day as needed. PAIN 60 Tab 4   • sertraline (ZOLOFT) 100 MG Tab Take 1 Tab by mouth every day. 30 Tab 6   • sertraline (ZOLOFT) 50 MG Tab Take 1 Tab by mouth every day. 30 Tab 11   • sumatriptan (IMITREX) 50 MG TABS Take 1 Tab by mouth Once PRN for Migraine. 5 Each 11     No current facility-administered medications for this visit.       Social History   Substance Use Topics   • Smoking status: Current Every Day Smoker -- 0.25 packs/day     Types: Cigarettes   • Smokeless tobacco: Never Used   • Alcohol Use: 0.0 oz/week     0 Standard drinks or equivalent per week      Comment: occ       Family History   Problem Relation Age of Onset   • Cancer Mother      stomach   • Diabetes Father    • Cancer Father      prostate   • Diabetes Paternal Grandmother    • Heart Disease Paternal Grandmother    • Stroke Neg Hx        ROS:  Review of Systems   Constitutional: Negative for fever, chills, weight loss and malaise/fatigue.   Eyes: Negative for blurred vision.   Cardiovascular: Negative for chest pain, palpitations, orthopnea and leg swelling.   Gastrointestinal: Negative for heartburn, nausea, vomiting  "and abdominal pain.    Endo/Heme/Allergies: Does not bruise/bleed easily.                Exam:  Blood pressure 122/90, pulse 88, temperature 36.9 °C (98.5 °F), resp. rate 16, height 1.626 m (5' 4.02\"), weight 85.548 kg (188 lb 9.6 oz), last menstrual period 09/13/2007, SpO2 92 %, not currently breastfeeding.  General:  Well nourished, well developed female in NAD  Head is grossly normal.  Neck: Supple without JVD or bruit. Thyroid is not enlarged. Trachea is midline.  Pulmonary: Clear to ausculation .  Normal effort. No rales, ronchi, or wheezing.  Cardiovascular: Regular rate and rhythm without murmur.  Abdomen-Abdomen is soft, normal bowel sounds, no masses, guarding, ororganomegaly, or tenderness.  Lower extremities- neg for pretibial edema, redness, tenderness.  Psych-normal affect with good eye contact. Normal grooming. Oriented x4.      Please note that this dictation was created using voice recognition software. I have made every reasonable attempt to correct obvious errors, but I expect that there are errors of grammar and possibly content that I did not discover before finalizing the note.  Obtained and reviewed patient utilization report from [facility] on 3/17/17, and based on assessment of report, the prescription for Norco 7.5 mg is medically necessary    Assessment/Plan:  1. Essential hypertension  chlorothiazide (DIURIL) 500 MG tablet   2. Chronic obstructive pulmonary disease, unspecified COPD type (CMS-HCC)     3. Anxiety     4. Chronic low back pain with sciatica, sciatica laterality unspecified, unspecified back pain laterality  hydrocodone/acetaminophen (NORCO)  MG Tab      Plan: 1. Patient may decrease fluoxetine down to 20 mg once a day for 2 days then discontinue for 2-3 days before starting a trial of Wellbutrin  mg once daily  2. Renew hydrocodone 7.5 mg, #45 per month.  3. Revisit in one month regarding mood/anxiety    "

## 2017-03-17 NOTE — ASSESSMENT & PLAN NOTE
Patient reports that she did not do well on Zoloft noting shortness of breath and episode on 1 day of severe palpitations and probably a panic episode. She discontinued the Zoloft after approximately 2 weeks from our last visit and returned back to her old standby 40 mg Prozac. She reports it is partially helpful but is hoping to find something that is more effective at reducing the level of her anxiety. She is not experiencing suicidal ideation, or tearfulness recently.

## 2017-03-17 NOTE — ASSESSMENT & PLAN NOTE
Patient reports overall breathing is comfortable and seems to be improved using the Anoro. She is smoking 2-3 cigarettes per day but also using her vapor instrument breaks or several times per day. Describes minimal current cough and minimal sputum production.

## 2017-04-14 ENCOUNTER — TELEPHONE (OUTPATIENT)
Dept: MEDICAL GROUP | Facility: MEDICAL CENTER | Age: 62
End: 2017-04-14

## 2017-04-14 ENCOUNTER — OFFICE VISIT (OUTPATIENT)
Dept: MEDICAL GROUP | Facility: MEDICAL CENTER | Age: 62
End: 2017-04-14
Attending: FAMILY MEDICINE
Payer: MEDICAID

## 2017-04-14 VITALS
OXYGEN SATURATION: 93 % | HEART RATE: 88 BPM | RESPIRATION RATE: 16 BRPM | HEIGHT: 64 IN | DIASTOLIC BLOOD PRESSURE: 84 MMHG | TEMPERATURE: 98.1 F | SYSTOLIC BLOOD PRESSURE: 120 MMHG | WEIGHT: 190 LBS | BODY MASS INDEX: 32.44 KG/M2

## 2017-04-14 DIAGNOSIS — G89.29 CHRONIC LOW BACK PAIN WITH SCIATICA, SCIATICA LATERALITY UNSPECIFIED, UNSPECIFIED BACK PAIN LATERALITY: ICD-10-CM

## 2017-04-14 DIAGNOSIS — F41.9 ANXIETY: ICD-10-CM

## 2017-04-14 DIAGNOSIS — M54.40 CHRONIC LOW BACK PAIN WITH SCIATICA, SCIATICA LATERALITY UNSPECIFIED, UNSPECIFIED BACK PAIN LATERALITY: ICD-10-CM

## 2017-04-14 PROCEDURE — 99213 OFFICE O/P EST LOW 20 MIN: CPT | Performed by: FAMILY MEDICINE

## 2017-04-14 RX ORDER — HYDROCODONE BITARTRATE AND ACETAMINOPHEN 10; 325 MG/1; MG/1
1 TABLET ORAL EVERY 8 HOURS PRN
Qty: 45 TAB | Refills: 0 | Status: SHIPPED | OUTPATIENT
Start: 2017-04-14 | End: 2017-05-12 | Stop reason: SDUPTHER

## 2017-04-14 RX ORDER — CYCLOBENZAPRINE HCL 10 MG
TABLET ORAL
Qty: 60 TAB | Refills: 5 | Status: SHIPPED | OUTPATIENT
Start: 2017-04-14 | End: 2017-04-14 | Stop reason: SDUPTHER

## 2017-04-14 RX ORDER — BUPROPION HYDROCHLORIDE 300 MG/1
300 TABLET ORAL EVERY MORNING
Qty: 30 TAB | Refills: 3 | Status: SHIPPED | OUTPATIENT
Start: 2017-04-14 | End: 2017-08-21 | Stop reason: SDUPTHER

## 2017-04-14 RX ORDER — CYCLOBENZAPRINE HCL 10 MG
TABLET ORAL
Qty: 60 TAB | Refills: 5 | Status: SHIPPED | OUTPATIENT
Start: 2017-04-14 | End: 2019-10-10

## 2017-04-14 RX ORDER — FLUOXETINE HYDROCHLORIDE 20 MG/1
CAPSULE ORAL
Qty: 60 CAP | Refills: 0 | Status: SHIPPED | OUTPATIENT
Start: 2017-04-14 | End: 2017-05-12 | Stop reason: SDUPTHER

## 2017-04-14 NOTE — PROGRESS NOTES
"Subjective:      Susan Pastrana is a 61 y.o. female who presents with Follow-Up            HPI 1. Anxiety-patient reports that she tolerated switching off the fluoxetine and onto Wellbutrin 150 mg XL without difficulty. She reports mood is doing okay denies any severe anxiety or tearfulness. Doesn't really feel much help with avoiding smoking. She is smoking just a few cigarettes per day and mainly using her vapor cigarette currently.    ROS notes minimal cough without current sputum production or significant shortness of breath.       Objective:     /84 mmHg  Pulse 88  Temp(Src) 36.7 °C (98.1 °F)  Resp 16  Ht 1.626 m (5' 4.02\")  Wt 86.183 kg (190 lb)  BMI 32.60 kg/m2  SpO2 93%  LMP 09/13/2007  Breastfeeding? No     Physical Exam  Gen.- alert, cooperative, in no acute distress  Neck- midline trachea, thyroid not enlarged or tender,supple, no cervical adenopathy  Chest-clear to auscultation and percussion with normal breath sounds. No retractions. Chest wall nontender  Cardiac- regular rhythm and rate. No murmur, thrill, or heave  Lower extremities-negative for tenderness, edema          Assessment/Plan:     1. Anxiety    Plan: 1. Increase Wellbutrin XL to 300 mg daily  2. Revisit in one month  3. Patient is encouraged to actively reduce the frequency of her smoking  4. Norco 10 mg, #45 is rewritten today-due in 3 days (4/17/17)        "

## 2017-04-14 NOTE — MR AVS SNAPSHOT
"        Susan Pastrana   2017 1:50 PM   Office Visit   MRN: [id number]    [facility]   Dept Phone:  [phone]    Description:  Female : 1955   Provider:  Yoan Valenzuela M.D.           Reason for Visit     Follow-Up           Allergies as of 2017     No Known Allergies      You were diagnosed with     Anxiety   [842082]       Chronic low back pain with sciatica, sciatica laterality unspecified, unspecified back pain laterality   [[id number]]         Vital Signs     Blood Pressure Pulse Temperature Respirations Height Weight    120/84 mmHg 88 36.7 °C (98.1 °F) 16 1.626 m (5' 4.02\") 86.183 kg (190 lb)    Body Mass Index Oxygen Saturation Last Menstrual Period Breastfeeding? Smoking Status       32.60 kg/m2 93% 2007 No Current Every Day Smoker       Basic Information     Date Of Birth Sex Race Ethnicity Preferred Language    1955 Female Unknown Non- English      Your appointments     May 12, 2017  1:50 PM   Established Patient with Yoan Valenzuela M.D.   [facility] ([facility])    [address] [place] [id number]   [phone]           You will be receiving a confirmation call a few days before your appointment from our automated call confirmation system.              Problem List              ICD-10-CM Priority Class Noted - Resolved    Anxiety F41.9   10/21/2014 - Present    Acute right flank pain R10.9   11/3/2014 - Present    Elevated liver enzymes R74.8   2014 - Present    Fatty liver K76.0   2014 - Present    Chronic low back pain M54.5, G89.29   2014 - Present    Essential hypertension I10   2015 - Present    Chronic use of opiate drugs therapeutic purposes Z79.899   2017 - Present    COPD (chronic obstructive pulmonary disease) (CMS-HCC) J44.9   2017 - Present      Health Maintenance        Date Due Completion Dates    IMM DTaP/Tdap/Td Vaccine (1 - Tdap) 1976 ---    IMM PNEUMOCOCCAL 19-64 (ADULT) " MEDIUM RISK SERIES (1 of 1 - PPSV23) 4/18/1976 ---    PAP SMEAR 4/18/1978 ---    MAMMOGRAM 4/18/1997 ---    COLONOSCOPY 4/18/2007 ---            Current Immunizations     Influenza Vaccine Quad Inj (Pf) 12/8/2015    Influenza Vaccine Quad Inj (Preserved) 10/13/2016    Tuberculin Skin Test 10/13/2016      Below and/or attached are the medications your provider expects you to take. Review all of your home medications and newly ordered medications with your provider and/or pharmacist. Follow medication instructions as directed by your provider and/or pharmacist. Please keep your medication list with you and share with your provider. Update the information when medications are discontinued, doses are changed, or new medications (including over-the-counter products) are added; and carry medication information at all times in the event of emergency situations     Allergies:  No Known Allergies          Medications  Valid as of: April 14, 2017 -  2:32 PM    Generic Name Brand Name Tablet Size Instructions for use    Albuterol Sulfate (Aero Soln) albuterol 108 (90 BASE) MCG/ACT INHALE 2 PUFFS ORALLY EVERY 6 HOURS IF NEEDED FOR SHORTNESS OF BREATH        BuPROPion HCl (TABLET SR 24 HR) WELLBUTRIN  MG Take 1 Tab by mouth every morning.        Chlorothiazide (Tab) DIURIL 500 MG TAKE 1 TABLET ORALLY EVERY MORNING        Cyclobenzaprine HCl (Tab) FLEXERIL 10 MG Take 1 Tab by mouth 3 times a day as needed. PAIN        Cyclobenzaprine HCl (Tab) FLEXERIL 10 MG TAKE 1 TABLET ORALLY 3 TIMES DAILY IF NEEDED FOR PAIN        Hydrocodone-Acetaminophen (Tab) NORCO  MG Take 1 Tab by mouth every 8 hours as needed.        Metoprolol Succinate (TABLET SR 24 HR) TOPROL XL 25 MG TAKE 1 TABLET ORALLY ONCE DAILY        SUMAtriptan Succinate (Tab) IMITREX 50 MG Take 1 Tab by mouth Once PRN for Migraine.        Umeclidinium-Vilanterol (AEROSOL POWDER, BREATH ACTIVATED) Umeclidinium-Vilanterol 62.5-25 MCG/INH Inhale 1 Puff by mouth every  day.        .                 Medicines prescribed today were sent to:     [facility] - [place], [place] - [address]    [address] [place] [address]    Phone: [phone] Fax: [phone]    Open 24 Hours?: No    [facility] - [place], [place] - [address]    [address] [place] [address]    Phone: [phone] Fax: [phone]    Open 24 Hours?: No      Medication refill instructions:       If your prescription bottle indicates you have medication refills left, it is not necessary to call your provider’s office. Please contact your pharmacy and they will refill your medication.    If your prescription bottle indicates you do not have any refills left, you may request refills at any time through one of the following ways: The online [employer] system (except Urgent Care), by calling your provider’s office, or by asking your pharmacy to contact your provider’s office with a refill request. Medication refills are processed only during regular business hours and may not be available until the next business day. Your provider may request additional information or to have a follow-up visit with you prior to refilling your medication.   *Please Note: Medication refills are assigned a new Rx number when refilled electronically. Your pharmacy may indicate that no refills were authorized even though a new prescription for the same medication is available at the pharmacy. Please request the medicine by name with the pharmacy before contacting your provider for a refill.           [employer] Access Code: [email or web address]  Expires: 5/14/2017  2:32 PM    [employer]  A secure, online tool to manage your health information     [employer]’s [employer]® is a secure, online tool that connects you to your personalized health information from the privacy of your home -- day or night - making it very easy for you to manage your healthcare. Once the activation process is completed, you can even access your  medical information using the Revolut leodan, which is available for free in the Apple Leodan store or Google Play store.     Rev[employer]ut provides the following levels of access (as shown below):   My Chart Features   [facility] Doctor [facility]  Specialists [facility]  Urgent  Care Non-[facility]  Primary Care  Doctor   Email your healthcare team securely and privately 24/7 X X X    Manage appointments: schedule your next appointment; view details of past/upcoming appointments X      Request prescription refills. X      View recent personal medical records, including lab and immunizations X X X X   View health record, including health history, allergies, medications X X X X   Read reports about your outpatient visits, procedures, consult and ER notes X X X X   See your discharge summary, which is a recap of your hospital and/or ER visit that includes your diagnosis, lab results, and care plan. X X       How to register for [employer]:  1. Go to  [email or web address].  2. Click on the Sign Up Now box, which takes you to the New Member Sign Up page. You will need to provide the following information:  a. Enter your Rev[employer]ut Access Code exactly as it appears at the top of this page. (You will not need to use this code after you’ve completed the sign-up process. If you do not sign up before the expiration date, you must request a new code.)   b. Enter your date of birth.   c. Enter your home email address.   d. Click Submit, and follow the next screen’s instructions.  3. Create a Revolut ID. This will be your Revolut login ID and cannot be changed, so think of one that is secure and easy to remember.  4. Create a Revolut password. You can change your password at any time.  5. Enter your Password Reset Question and Answer. This can be used at a later time if you forget your password.   6. Enter your e-mail address. This allows you to receive e-mail notifications when new information is available in Revolut.  7. Click Sign Up. You  can now view your health information.    For assistance activating your [employer] account, call [phone]        Quit Tobacco Information     Do you want to quit using tobacco?    Quitting tobacco decreases risks of cancer, heart and lung disease, increases life expectancy, improves sense of taste and smell, and increases spending money, among other benefits.    If you are thinking about quitting, we can help.  • [facility] Quit Tobacco Program: [phone]  o Program occurs weekly for four weeks and includes pharmacist consultation on products to support quitting smoking or chewing tobacco. A provider referral is needed for pharmacist consultation.  • Tobacco Users Help Hotline: 7-293-QUIT-NOW (500-1512) or https://[place].quitlogix.org/  o Free, confidential telephone and online coaching for [place] residents. Sessions are designed on a schedule that is convenient for you. Eligible clients receive free nicotine replacement therapy.  • Nationally: www.smokefree.gov  o Information and professional assistance to support both immediate and long-term needs as you become, and remain, a non-smoker. Smokefree.gov allows you to choose the help that best fits your needs.

## 2017-05-12 ENCOUNTER — OFFICE VISIT (OUTPATIENT)
Dept: MEDICAL GROUP | Facility: MEDICAL CENTER | Age: 62
End: 2017-05-12
Attending: FAMILY MEDICINE
Payer: MEDICAID

## 2017-05-12 VITALS
SYSTOLIC BLOOD PRESSURE: 112 MMHG | RESPIRATION RATE: 16 BRPM | HEIGHT: 64 IN | WEIGHT: 185 LBS | TEMPERATURE: 97.9 F | BODY MASS INDEX: 31.58 KG/M2 | DIASTOLIC BLOOD PRESSURE: 78 MMHG | HEART RATE: 84 BPM | OXYGEN SATURATION: 93 %

## 2017-05-12 DIAGNOSIS — G89.29 CHRONIC LOW BACK PAIN WITH SCIATICA, SCIATICA LATERALITY UNSPECIFIED, UNSPECIFIED BACK PAIN LATERALITY: ICD-10-CM

## 2017-05-12 DIAGNOSIS — M54.40 CHRONIC LOW BACK PAIN WITH SCIATICA, SCIATICA LATERALITY UNSPECIFIED, UNSPECIFIED BACK PAIN LATERALITY: ICD-10-CM

## 2017-05-12 DIAGNOSIS — F41.9 ANXIETY: ICD-10-CM

## 2017-05-12 DIAGNOSIS — Z12.31 ENCOUNTER FOR SCREENING MAMMOGRAM FOR MALIGNANT NEOPLASM OF BREAST: ICD-10-CM

## 2017-05-12 DIAGNOSIS — R53.83 FATIGUE, UNSPECIFIED TYPE: ICD-10-CM

## 2017-05-12 PROCEDURE — 99213 OFFICE O/P EST LOW 20 MIN: CPT | Performed by: FAMILY MEDICINE

## 2017-05-12 RX ORDER — FLUOXETINE HYDROCHLORIDE 20 MG/1
CAPSULE ORAL
Qty: 60 CAP | Refills: 4 | Status: SHIPPED | OUTPATIENT
Start: 2017-05-12 | End: 2017-10-23

## 2017-05-12 RX ORDER — HYDROCODONE BITARTRATE AND ACETAMINOPHEN 10; 325 MG/1; MG/1
1 TABLET ORAL EVERY 8 HOURS PRN
Qty: 45 TAB | Refills: 0 | Status: SHIPPED | OUTPATIENT
Start: 2017-05-12 | End: 2017-06-12 | Stop reason: SDUPTHER

## 2017-05-12 NOTE — PROGRESS NOTES
"Subjective:      Susan Pastrana is a 62 y.o. female who presents with No chief complaint on file.            HPI 1. Fatigue-patient notes a gradually increasing sense of fatigue each day over the past perhaps several months. Reports sleeping 6-8 hours each night. No unexplained hair loss or cold intolerance. She is not on thyroid medication.  2. Anxiety-patient reports overall moderate improvement since switching from fluoxetine to Wellbutrin. She did have a episode where the palms of both hands became red and subsequently peeled and she had a papular red eruption on her arms bilaterally but not trunk or legs. She reports the skin changes developed about 2 weeks ago and now are mostly cleared.    ROS negative for near syncope, chest pain, chest pressure       Objective:     /78 mmHg  Pulse 84  Temp(Src) 36.6 °C (97.9 °F)  Resp 16  Ht 1.626 m (5' 4.02\")  Wt 83.915 kg (185 lb)  BMI 31.74 kg/m2  SpO2 93%  LMP 09/13/2007  Breastfeeding? No     Physical Exam  .Gen.- alert, cooperative, in no acute distress  Neck- midline trachea, thyroid not enlarged or tender,supple, no cervical adenopathy  Chest-clear to auscultation and percussion with normal breath sounds. No retractions. Chest wall nontender  Cardiac- regular rhythm and rate. No murmur, thrill, or heave  Skin-mild palmar erythema is noted with no significant scaling at this time. A few very residual 1-2 mm red papules are noted over the forearms several of these are just small excoriations. Remainder of the skin is clear.  Psych-normal affect with good eye contact. Normal grooming. Oriented x4.            Assessment/Plan:     1. Encounter for screening mammogram for malignant neoplasm of breast    - MA-SCREEN MAMMO W/CAD-BILAT    2. Fatigue, unspecified type      3. Anxiety    Plan: 1. Check a CMP, TSH, CBC, serum B-12 level regarding fatigue  2. Continue Wellbutrin  3. Revisit in one month      "

## 2017-05-12 NOTE — MR AVS SNAPSHOT
"        Susan Pastrana   2017 1:50 PM   Office Visit   MRN: [id number]    Department:  [facility]   Dept Phone:  [phone]    Description:  Female : 1955   Provider:  Yoan Valenzuela M.D.           Reason for Visit     Anxiety           Allergies as of 2017     No Known Allergies      You were diagnosed with     Encounter for screening mammogram for malignant neoplasm of breast   [[id number]]       Fatigue, unspecified type   [[id number]]       Anxiety   [321556]       Chronic low back pain with sciatica, sciatica laterality unspecified, unspecified back pain laterality   [[id number]]         Vital Signs     Blood Pressure Pulse Temperature Respirations Height Weight    112/78 mmHg 84 36.6 °C (97.9 °F) 16 1.626 m (5' 4.02\") 83.915 kg (185 lb)    Body Mass Index Oxygen Saturation Last Menstrual Period Breastfeeding? Smoking Status       31.74 kg/m2 93% 2007 No Current Every Day Smoker       Basic Information     Date Of Birth Sex Race Ethnicity Preferred Language    1955 Female Unknown Non- English      Your appointments     2017  2:10 PM   Established Patient with Yoan Valenzuela M.D.   [facility] ([facility])    [address] [place] [id number] [phone]           You will be receiving a confirmation call a few days before your appointment from our automated call confirmation system.              Problem List              ICD-10-CM Priority Class Noted - Resolved    Anxiety F41.9   10/21/2014 - Present    Acute right flank pain R10.9   11/3/2014 - Present    Elevated liver enzymes R74.8   2014 - Present    Fatty liver K76.0   2014 - Present    Chronic low back pain M54.5, G89.29   2014 - Present    Essential hypertension I10   2015 - Present    Chronic use of opiate drugs therapeutic purposes Z79.891   2017 - Present    COPD (chronic obstructive pulmonary disease) (CMS-HCC) J44.9   2017 - Present      Health " Maintenance        Date Due Completion Dates    IMM DTaP/Tdap/Td Vaccine (1 - Tdap) 4/18/1976 ---    IMM PNEUMOCOCCAL 19-64 (ADULT) MEDIUM RISK SERIES (1 of 1 - PPSV23) 4/18/1976 ---    PAP SMEAR 4/18/1978 ---    MAMMOGRAM 4/18/1997 ---    COLONOSCOPY 4/18/2007 ---    IMM ZOSTER VACCINE 4/18/2017 ---            Current Immunizations     Influenza Vaccine Quad Inj (Pf) 12/8/2015    Influenza Vaccine Quad Inj (Preserved) 10/13/2016    Tuberculin Skin Test 10/13/2016      Below and/or attached are the medications your provider expects you to take. Review all of your home medications and newly ordered medications with your provider and/or pharmacist. Follow medication instructions as directed by your provider and/or pharmacist. Please keep your medication list with you and share with your provider. Update the information when medications are discontinued, doses are changed, or new medications (including over-the-counter products) are added; and carry medication information at all times in the event of emergency situations     Allergies:  No Known Allergies          Medications  Valid as of: May 12, 2017 -  2:33 PM    Generic Name Brand Name Tablet Size Instructions for use    Albuterol Sulfate (Aero Soln) albuterol 108 (90 BASE) MCG/ACT INHALE 2 PUFFS ORALLY EVERY 6 HOURS IF NEEDED FOR SHORTNESS OF BREATH        BuPROPion HCl (TABLET SR 24 HR) WELLBUTRIN  MG Take 1 Tab by mouth every morning.        Chlorothiazide (Tab) DIURIL 500 MG TAKE 1 TABLET ORALLY EVERY MORNING        Cyclobenzaprine HCl (Tab) FLEXERIL 10 MG Take 1 Tab by mouth 3 times a day as needed. PAIN        Cyclobenzaprine HCl (Tab) FLEXERIL 10 MG TAKE 1 TABLET ORALLY 3 TIMES DAILY IF NEEDED FOR PAIN        FLUoxetine HCl (Cap) PROZAC 20 MG TAKE 2 CAPSULES ORALLY ONCE DAILY        Hydrocodone-Acetaminophen (Tab) NORCO  MG Take 1 Tab by mouth every 8 hours as needed.        Metoprolol Succinate (TABLET SR 24 HR) TOPROL XL 25 MG TAKE 1 TABLET  ORALLY ONCE DAILY        SUMAtriptan Succinate (Tab) IMITREX 50 MG Take 1 Tab by mouth Once PRN for Migraine.        Umeclidinium-Vilanterol (AEROSOL POWDER, BREATH ACTIVATED) Umeclidinium-Vilanterol 62.5-25 MCG/INH Inhale 1 Puff by mouth every day.        .                 Medicines prescribed today were sent to:     [facility] - [place], [place] - [address]    [address] [place] [address]    Phone: [phone] Fax: [phone]    Open 24 Hours?: No    [facility] [place] - [address]    [address] [place] [address]    Phone: [phone] Fax: [phone]    Open 24 Hours?: No      Medication refill instructions:       If your prescription bottle indicates you have medication refills left, it is not necessary to call your provider’s office. Please contact your pharmacy and they will refill your medication.    If your prescription bottle indicates you do not have any refills left, you may request refills at any time through one of the following ways: The online [employer] system (except Urgent Care), by calling your provider’s office, or by asking your pharmacy to contact your provider’s office with a refill request. Medication refills are processed only during regular business hours and may not be available until the next business day. Your provider may request additional information or to have a follow-up visit with you prior to refilling your medication.   *Please Note: Medication refills are assigned a new Rx number when refilled electronically. Your pharmacy may indicate that no refills were authorized even though a new prescription for the same medication is available at the pharmacy. Please request the medicine by name with the pharmacy before contacting your provider for a refill.        Your To Do List     Future Labs/Procedures Complete By Expires    CBC WITH DIFFERENTIAL  As directed 5/13/2018    COMP METABOLIC PANEL  As directed 5/13/2018    TSH  As directed  5/13/2018    VITAMIN B12  As directed 5/12/2018         [employer] Access Code: [email or web address]  Expires: 5/14/2017  2:32 PM    [employer]  A secure, online tool to manage your health information     [employer]’s [employer]® is a secure, online tool that connects you to your personalized health information from the privacy of your home -- day or night - making it very easy for you to manage your healthcare. Once the activation process is completed, you can even access your medical information using the [employer] leodan, which is available for free in the Apple Leodan store or Google Play store.     [employer] provides the following levels of access (as shown below):   My Chart Features   [facility] Primary [facility] Doctor [facility]  Specialists [facility]  Urgent  Care [facility]  Primary Care  Doctor   Email your healthcare team securely and privately 24/7 X X X    Manage appointments: schedule your next appointment; view details of past/upcoming appointments X      Request prescription refills. X      View recent personal medical records, including lab and immunizations X X X X   View health record, including health history, allergies, medications X X X X   Read reports about your outpatient visits, procedures, consult and ER notes X X X X   See your discharge summary, which is a recap of your hospital and/or ER visit that includes your diagnosis, lab results, and care plan. X X       How to register for [employer]:  1. Go to  [email or web address].  2. Click on the Sign Up Now box, which takes you to the New Member Sign Up page. You will need to provide the following information:  a. Enter your [employer] Access Code exactly as it appears at the top of this page. (You will not need to use this code after you’ve completed the sign-up process. If you do not sign up before the expiration date, you must request a new code.)   b. Enter your date of birth.   c. Enter your home email address.   d. Click Submit, and follow the next screen’s  instructions.  3. Create a [employer] ID. This will be your [employer] login ID and cannot be changed, so think of one that is secure and easy to remember.  4. Create a [employer] password. You can change your password at any time.  5. Enter your Password Reset Question and Answer. This can be used at a later time if you forget your password.   6. Enter your e-mail address. This allows you to receive e-mail notifications when new information is available in [employer].  7. Click Sign Up. You can now view your health information.    For assistance activating your [employer] account, call [phone]        Quit Tobacco Information     Do you want to quit using tobacco?    Quitting tobacco decreases risks of cancer, heart and lung disease, increases life expectancy, improves sense of taste and smell, and increases spending money, among other benefits.    If you are thinking about quitting, we can help.  • [facility] Quit Tobacco Program: 128.625.1176  o Program occurs weekly for four weeks and includes pharmacist consultation on products to support quitting smoking or chewing tobacco. A provider referral is needed for pharmacist consultation.  • Tobacco Users Help Hotline: 5-138-QUIT-NOW (609-3051) or https://[place].quitlogix.org/  o Free, confidential telephone and online coaching for [place] residents. Sessions are designed on a schedule that is convenient for you. Eligible clients receive free nicotine replacement therapy.  • Nationally: www.smokefree.gov  o Information and professional assistance to support both immediate and long-term needs as you become, and remain, a non-smoker. Smokefree.gov allows you to choose the help that best fits your needs.

## 2017-05-19 ENCOUNTER — HOSPITAL ENCOUNTER (OUTPATIENT)
Dept: LAB | Facility: MEDICAL CENTER | Age: 62
End: 2017-05-19
Attending: FAMILY MEDICINE
Payer: MEDICAID

## 2017-05-19 DIAGNOSIS — R53.83 FATIGUE, UNSPECIFIED TYPE: ICD-10-CM

## 2017-05-19 LAB
ALBUMIN SERPL BCP-MCNC: 4.5 G/DL (ref 3.2–4.9)
ALBUMIN/GLOB SERPL: 1.6 G/DL
ALP SERPL-CCNC: 74 U/L (ref 30–99)
ALT SERPL-CCNC: 26 U/L (ref 2–50)
ANION GAP SERPL CALC-SCNC: 7 MMOL/L (ref 0–11.9)
AST SERPL-CCNC: 22 U/L (ref 12–45)
BASOPHILS # BLD AUTO: 0.7 % (ref 0–1.8)
BASOPHILS # BLD: 0.05 K/UL (ref 0–0.12)
BILIRUB SERPL-MCNC: 0.5 MG/DL (ref 0.1–1.5)
BUN SERPL-MCNC: 13 MG/DL (ref 8–22)
CALCIUM SERPL-MCNC: 9.6 MG/DL (ref 8.5–10.5)
CHLORIDE SERPL-SCNC: 102 MMOL/L (ref 96–112)
CO2 SERPL-SCNC: 27 MMOL/L (ref 20–33)
CREAT SERPL-MCNC: 0.87 MG/DL (ref 0.5–1.4)
EOSINOPHIL # BLD AUTO: 0.28 K/UL (ref 0–0.51)
EOSINOPHIL NFR BLD: 3.9 % (ref 0–6.9)
ERYTHROCYTE [DISTWIDTH] IN BLOOD BY AUTOMATED COUNT: 47.1 FL (ref 35.9–50)
GFR SERPL CREATININE-BSD FRML MDRD: >60 ML/MIN/1.73 M 2
GLOBULIN SER CALC-MCNC: 2.9 G/DL (ref 1.9–3.5)
GLUCOSE SERPL-MCNC: 88 MG/DL (ref 65–99)
HCT VFR BLD AUTO: 44.9 % (ref 37–47)
HGB BLD-MCNC: 14.6 G/DL (ref 12–16)
IMM GRANULOCYTES # BLD AUTO: 0.02 K/UL (ref 0–0.11)
IMM GRANULOCYTES NFR BLD AUTO: 0.3 % (ref 0–0.9)
LYMPHOCYTES # BLD AUTO: 2.29 K/UL (ref 1–4.8)
LYMPHOCYTES NFR BLD: 32.2 % (ref 22–41)
MCH RBC QN AUTO: 31.6 PG (ref 27–33)
MCHC RBC AUTO-ENTMCNC: 32.5 G/DL (ref 33.6–35)
MCV RBC AUTO: 97.2 FL (ref 81.4–97.8)
MONOCYTES # BLD AUTO: 0.48 K/UL (ref 0–0.85)
MONOCYTES NFR BLD AUTO: 6.7 % (ref 0–13.4)
NEUTROPHILS # BLD AUTO: 4 K/UL (ref 2–7.15)
NEUTROPHILS NFR BLD: 56.2 % (ref 44–72)
NRBC # BLD AUTO: 0 K/UL
NRBC BLD AUTO-RTO: 0 /100 WBC
PLATELET # BLD AUTO: 324 K/UL (ref 164–446)
PMV BLD AUTO: 11 FL (ref 9–12.9)
POTASSIUM SERPL-SCNC: 4.1 MMOL/L (ref 3.6–5.5)
PROT SERPL-MCNC: 7.4 G/DL (ref 6–8.2)
RBC # BLD AUTO: 4.62 M/UL (ref 4.2–5.4)
SODIUM SERPL-SCNC: 136 MMOL/L (ref 135–145)
TSH SERPL DL<=0.005 MIU/L-ACNC: 2.48 UIU/ML (ref 0.3–3.7)
VIT B12 SERPL-MCNC: 927 PG/ML (ref 211–911)
WBC # BLD AUTO: 7.1 K/UL (ref 4.8–10.8)

## 2017-05-19 PROCEDURE — 85025 COMPLETE CBC W/AUTO DIFF WBC: CPT

## 2017-05-19 PROCEDURE — 84443 ASSAY THYROID STIM HORMONE: CPT

## 2017-05-19 PROCEDURE — 36415 COLL VENOUS BLD VENIPUNCTURE: CPT

## 2017-05-19 PROCEDURE — 82607 VITAMIN B-12: CPT

## 2017-05-19 PROCEDURE — 80053 COMPREHEN METABOLIC PANEL: CPT

## 2017-05-24 ENCOUNTER — TELEPHONE (OUTPATIENT)
Dept: MEDICAL GROUP | Facility: MEDICAL CENTER | Age: 62
End: 2017-05-24

## 2017-05-24 NOTE — TELEPHONE ENCOUNTER
----- Message from Yoan Valenzuela M.D. sent at 5/22/2017  7:13 AM PDT -----  Recent labs showed normal thyroid level, super abundant B-12 level, normal electrolytes, normal sugar, normal liver and kidney function, normal white and red blood cell counts with no anemia. Labs do not show any obvious cause for fatigue.

## 2017-05-24 NOTE — TELEPHONE ENCOUNTER
Phone Number Called: [phone] (home)       Message: Called patient and left a detailed message. Asked patient to call back if they had any questions or concerns.       Left Message for patient to call back: yes

## 2017-06-12 ENCOUNTER — OFFICE VISIT (OUTPATIENT)
Dept: MEDICAL GROUP | Facility: MEDICAL CENTER | Age: 62
End: 2017-06-12
Attending: FAMILY MEDICINE
Payer: MEDICAID

## 2017-06-12 VITALS
HEART RATE: 88 BPM | WEIGHT: 182 LBS | TEMPERATURE: 97 F | DIASTOLIC BLOOD PRESSURE: 64 MMHG | RESPIRATION RATE: 18 BRPM | OXYGEN SATURATION: 93 % | BODY MASS INDEX: 31.07 KG/M2 | HEIGHT: 64 IN | SYSTOLIC BLOOD PRESSURE: 110 MMHG

## 2017-06-12 DIAGNOSIS — F41.9 ANXIETY: ICD-10-CM

## 2017-06-12 DIAGNOSIS — Z12.31 ENCOUNTER FOR SCREENING MAMMOGRAM FOR MALIGNANT NEOPLASM OF BREAST: ICD-10-CM

## 2017-06-12 DIAGNOSIS — G89.29 CHRONIC LOW BACK PAIN WITH SCIATICA, SCIATICA LATERALITY UNSPECIFIED, UNSPECIFIED BACK PAIN LATERALITY: ICD-10-CM

## 2017-06-12 DIAGNOSIS — M54.40 CHRONIC LOW BACK PAIN WITH SCIATICA, SCIATICA LATERALITY UNSPECIFIED, UNSPECIFIED BACK PAIN LATERALITY: ICD-10-CM

## 2017-06-12 DIAGNOSIS — R53.83 FATIGUE, UNSPECIFIED TYPE: ICD-10-CM

## 2017-06-12 PROCEDURE — 99213 OFFICE O/P EST LOW 20 MIN: CPT | Performed by: FAMILY MEDICINE

## 2017-06-12 RX ORDER — HYDROCODONE BITARTRATE AND ACETAMINOPHEN 10; 325 MG/1; MG/1
1 TABLET ORAL EVERY 8 HOURS PRN
Qty: 45 TAB | Refills: 0 | Status: SHIPPED | OUTPATIENT
Start: 2017-06-12 | End: 2017-07-18 | Stop reason: SDUPTHER

## 2017-06-12 ASSESSMENT — PAIN SCALES - GENERAL: PAINLEVEL: NO PAIN

## 2017-06-12 NOTE — MR AVS SNAPSHOT
"        Susan Pastrana   2017 2:10 PM   Office Visit   MRN: [id number]    Department:  [facility]   Dept Phone:  [phone]    Description:  Female : 1955   Provider:  Yoan Valenzuela M.D.           Reason for Visit     Anxiety follow up      Allergies as of 2017     No Known Allergies      You were diagnosed with     Encounter for screening mammogram for malignant neoplasm of breast   [467407]       Anxiety   [[id number]]       Fatigue, unspecified type   [1790958]       Chronic low back pain with sciatica, sciatica laterality unspecified, unspecified back pain laterality   [[id number]]         Vital Signs     Blood Pressure Pulse Temperature Respirations Height Weight    110/64 mmHg 88 36.1 °C (97 °F) 18 1.626 m (5' 4.02\") 82.555 kg (182 lb)    Body Mass Index Oxygen Saturation Last Menstrual Period Breastfeeding? Smoking Status       31.22 kg/m2 93% 2007 No Current Every Day Smoker       Basic Information     Date Of Birth Sex Race Ethnicity Preferred Language    1955 Female Unknown Non- English      Your appointments     2017  1:50 PM   Established Patient with Yoan Valenzuela M.D.   [facility] ([facility])    [address] [place] [id number] [phone]           You will be receiving a confirmation call a few days before your appointment from our automated call confirmation system.              Problem List              ICD-10-CM Priority Class Noted - Resolved    Anxiety F41.9   10/21/2014 - Present    Acute right flank pain R10.9   11/3/2014 - Present    Elevated liver enzymes R74.8   2014 - Present    Fatty liver K76.0   2014 - Present    Chronic low back pain M54.5, G89.29   2014 - Present    Essential hypertension I10   2015 - Present    Chronic use of opiate drugs therapeutic purposes Z79.891   2017 - Present    COPD (chronic obstructive pulmonary disease) (CMS-HCC) J44.9   2017 - Present      Health " Maintenance        Date Due Completion Dates    IMM DTaP/Tdap/Td Vaccine (1 - Tdap) 4/18/1976 ---    IMM PNEUMOCOCCAL 19-64 (ADULT) MEDIUM RISK SERIES (1 of 1 - PPSV23) 4/18/1976 ---    PAP SMEAR 4/18/1978 ---    MAMMOGRAM 4/18/1997 ---    COLONOSCOPY 4/18/2007 ---    IMM ZOSTER VACCINE 4/18/2017 ---            Current Immunizations     Influenza Vaccine Quad Inj (Pf) 12/8/2015    Influenza Vaccine Quad Inj (Preserved) 10/13/2016    Tuberculin Skin Test 10/13/2016      Below and/or attached are the medications your provider expects you to take. Review all of your home medications and newly ordered medications with your provider and/or pharmacist. Follow medication instructions as directed by your provider and/or pharmacist. Please keep your medication list with you and share with your provider. Update the information when medications are discontinued, doses are changed, or new medications (including over-the-counter products) are added; and carry medication information at all times in the event of emergency situations     Allergies:  No Known Allergies          Medications  Valid as of: June 12, 2017 -  2:52 PM    Generic Name Brand Name Tablet Size Instructions for use    Albuterol Sulfate (Aero Soln) albuterol 108 (90 BASE) MCG/ACT INHALE 2 PUFFS ORALLY EVERY 6 HOURS IF NEEDED FOR SHORTNESS OF BREATH        BuPROPion HCl (TABLET SR 24 HR) WELLBUTRIN  MG Take 1 Tab by mouth every morning.        Chlorothiazide (Tab) DIURIL 500 MG TAKE 1 TABLET ORALLY EVERY MORNING        Cyclobenzaprine HCl (Tab) FLEXERIL 10 MG Take 1 Tab by mouth 3 times a day as needed. PAIN        Cyclobenzaprine HCl (Tab) FLEXERIL 10 MG TAKE 1 TABLET ORALLY 3 TIMES DAILY IF NEEDED FOR PAIN        Hydrocodone-Acetaminophen (Tab) NORCO  MG Take 1 Tab by mouth every 8 hours as needed.        Metoprolol Succinate (TABLET SR 24 HR) TOPROL XL 25 MG TAKE 1 TABLET ORALLY ONCE DAILY        SUMAtriptan Succinate (Tab) IMITREX 50 MG Take 1 Tab  by mouth Once PRN for Migraine.        Umeclidinium-Vilanterol (AEROSOL POWDER, BREATH ACTIVATED) Umeclidinium-Vilanterol 62.5-25 MCG/INH Inhale 1 Puff by mouth every day.        .                 Medicines prescribed today were sent to:     [facility] - [place], [place] - [address]    [address] [place] [address]    Phone: [phone] Fax: [phone]    Open 24 Hours?: No    [facility] - [place], [place] - [address]    [address] [place] [address]    Phone: [phone] Fax: [phone]    Open 24 Hours?: No      Medication refill instructions:       If your prescription bottle indicates you have medication refills left, it is not necessary to call your provider’s office. Please contact your pharmacy and they will refill your medication.    If your prescription bottle indicates you do not have any refills left, you may request refills at any time through one of the following ways: The online [email or web address] system (except Urgent Care), by calling your provider’s office, or by asking your pharmacy to contact your provider’s office with a refill request. Medication refills are processed only during regular business hours and may not be available until the next business day. Your provider may request additional information or to have a follow-up visit with you prior to refilling your medication.   *Please Note: Medication refills are assigned a new Rx number when refilled electronically. Your pharmacy may indicate that no refills were authorized even though a new prescription for the same medication is available at the pharmacy. Please request the medicine by name with the pharmacy before contacting your provider for a refill.           MyChart Status: Patient Declined        Quit Tobacco Information     Do you want to quit using tobacco?    Quitting tobacco decreases risks of cancer, heart and lung disease, increases life expectancy, improves sense of taste and smell, and increases  spending money, among other benefits.    If you are thinking about quitting, we can help.  • [facility] Quit Tobacco Program: 799.980.5682  o Program occurs weekly for four weeks and includes pharmacist consultation on products to support quitting smoking or chewing tobacco. A provider referral is needed for pharmacist consultation.  • Tobacco Users Help Hotline: 5-197-QUIT-NOW (184-9114) or https://[place].quitlogix.org/  o Free, confidential telephone and online coaching for [place] residents. Sessions are designed on a schedule that is convenient for you. Eligible clients receive free nicotine replacement therapy.  • Nationally: www.smokefree.gov  o Information and professional assistance to support both immediate and long-term needs as you become, and remain, a non-smoker. Smokefree.gov allows you to choose the help that best fits your needs.

## 2017-06-12 NOTE — PROGRESS NOTES
"Subjective:      Susan Pastrana is a 62 y.o. female who presents with Anxiety            Anxiety         1. Anxiety-patient reports anxiety is fairly well controlled currently. She is continuing to take Wellbutrin  mg once each morning. Not reporting any side effects such as nausea or irritability.  2. Fatigue-recent labs show normal CBC, CMP, TSH, B-12 level. Patient notes less fatigue on days that she does not have to work which allows her to sleep in later. She has difficulty getting to sleep much earlier on work nights. Seems to be better if she gets over 7 hours of sleep.    ROS negative for tearfulness, chest pain, chest pressure, near syncope       Objective:     /64 mmHg  Pulse 88  Temp(Src) 36.1 °C (97 °F)  Resp 18  Ht 1.626 m (5' 4.02\")  Wt 82.555 kg (182 lb)  BMI 31.22 kg/m2  SpO2 93%  LMP 09/13/2007  Breastfeeding? No     Physical Exam  Gen.- alert, cooperative, in no acute distress  Neck- midline trachea, thyroid not enlarged or tender,supple, no cervical adenopathy  Chest-clear to auscultation and percussion with normal breath sounds. No retractions. Chest wall nontender  Cardiac- regular rhythm and rate. No murmur, thrill, or heave  Psych-normal affect with good eye contact. Normal grooming. Oriented x4.            Assessment/Plan:     1. Encounter for screening mammogram for malignant neoplasm of breast    - MA-SCREEN MAMMO W/CAD-BILAT    2. Anxiety      3. Fatigue, unspecified type    Plan: 1. Continue Wellbutrin, current dose  2. Follow-up one month  3. Renew Norco 10/325, #45    "

## 2017-07-20 RX ORDER — HYDROCODONE BITARTRATE AND ACETAMINOPHEN 10; 325 MG/1; MG/1
TABLET ORAL
Qty: 45 TAB | Refills: 0 | Status: SHIPPED | OUTPATIENT
Start: 2017-07-20 | End: 2017-08-18 | Stop reason: SDUPTHER

## 2017-07-20 NOTE — TELEPHONE ENCOUNTER
Refill done.   checked and consistent.  Patient has an updated controlled substance agreement and a consistent UDS from 4/16.  Cristina Davila M.D.

## 2017-07-20 NOTE — TELEPHONE ENCOUNTER
Was placed into the basket for  for [facility] pharmacy by Dr. Davila. Pt advised pharmacy will contact when ready for .

## 2017-08-18 ENCOUNTER — OFFICE VISIT (OUTPATIENT)
Dept: MEDICAL GROUP | Facility: MEDICAL CENTER | Age: 62
End: 2017-08-18
Attending: FAMILY MEDICINE
Payer: MEDICAID

## 2017-08-18 VITALS
RESPIRATION RATE: 16 BRPM | HEART RATE: 80 BPM | WEIGHT: 185 LBS | OXYGEN SATURATION: 94 % | DIASTOLIC BLOOD PRESSURE: 76 MMHG | SYSTOLIC BLOOD PRESSURE: 124 MMHG | TEMPERATURE: 97.3 F | HEIGHT: 64 IN | BODY MASS INDEX: 31.58 KG/M2

## 2017-08-18 DIAGNOSIS — G89.29 CHRONIC LOW BACK PAIN WITHOUT SCIATICA, UNSPECIFIED BACK PAIN LATERALITY: ICD-10-CM

## 2017-08-18 DIAGNOSIS — M54.50 CHRONIC LOW BACK PAIN WITHOUT SCIATICA, UNSPECIFIED BACK PAIN LATERALITY: ICD-10-CM

## 2017-08-18 DIAGNOSIS — Z12.39 SCREENING FOR MALIGNANT NEOPLASM OF BREAST: ICD-10-CM

## 2017-08-18 PROCEDURE — 99213 OFFICE O/P EST LOW 20 MIN: CPT | Performed by: FAMILY MEDICINE

## 2017-08-18 RX ORDER — HYDROCODONE BITARTRATE AND ACETAMINOPHEN 10; 325 MG/1; MG/1
TABLET ORAL
Qty: 45 TAB | Refills: 0 | Status: SHIPPED
Start: 2017-08-18 | End: 2017-08-18 | Stop reason: SDUPTHER

## 2017-08-18 RX ORDER — HYDROCODONE BITARTRATE AND ACETAMINOPHEN 10; 325 MG/1; MG/1
TABLET ORAL
Qty: 45 TAB | Refills: 0 | Status: SHIPPED | OUTPATIENT
Start: 2017-08-18 | End: 2017-08-24 | Stop reason: SDUPTHER

## 2017-08-18 NOTE — MR AVS SNAPSHOT
"        Susan Pastrana   2017 3:30 PM   Office Visit   MRN: [id number]    [facility]   Dept Phone:  [phone]    Description:  Female : 1955   Provider:  Yoan Valenzuela M.D.           Reason for Visit     Follow-Up           Allergies as of 2017     No Known Allergies      You were diagnosed with     Screening for malignant neoplasm of breast   [[id number]]       Chronic low back pain without sciatica, unspecified back pain laterality   [[id number]]         Vital Signs     Blood Pressure Pulse Temperature Respirations Height Weight    124/76 mmHg 80 36.3 °C (97.3 °F) 16 1.626 m (5' 4.02\") 83.915 kg (185 lb)    Body Mass Index Oxygen Saturation Last Menstrual Period Breastfeeding? Smoking Status       31.74 kg/m2 94% 2007 No Current Every Day Smoker       Basic Information     Date Of Birth Sex Race Ethnicity Preferred Language    1955 Female Unknown Non- English      Problem List              ICD-10-CM Priority Class Noted - Resolved    Anxiety F41.9   10/21/2014 - Present    Acute right flank pain R10.9   11/3/2014 - Present    Elevated liver enzymes R74.8   2014 - Present    Fatty liver K76.0   2014 - Present    Chronic low back pain M54.5, G89.29   2014 - Present    Essential hypertension I10   2015 - Present    Chronic use of opiate drugs therapeutic purposes Z79.891   2017 - Present    COPD (chronic obstructive pulmonary disease) (CMS-HCC) J44.9   2017 - Present      Health Maintenance        Date Due Completion Dates    IMM DTaP/Tdap/Td Vaccine (1 - Tdap) 1976 ---    IMM PNEUMOCOCCAL 19-64 (ADULT) MEDIUM RISK SERIES (1 of 1 - PPSV23) 1976 ---    PAP SMEAR 1978 ---    MAMMOGRAM 1997 ---    COLONOSCOPY 2007 ---    IMM ZOSTER VACCINE 2017 ---    IMM INFLUENZA (1) 2017 10/13/2016, 2015            Current Immunizations     Influenza Vaccine Quad Inj (Pf) 2015    Influenza Vaccine Quad Inj " (Preserved) 10/13/2016    Tuberculin Skin Test 10/13/2016      Below and/or attached are the medications your provider expects you to take. Review all of your home medications and newly ordered medications with your provider and/or pharmacist. Follow medication instructions as directed by your provider and/or pharmacist. Please keep your medication list with you and share with your provider. Update the information when medications are discontinued, doses are changed, or new medications (including over-the-counter products) are added; and carry medication information at all times in the event of emergency situations     Allergies:  No Known Allergies          Medications  Valid as of: August 18, 2017 -  4:40 PM    Generic Name Brand Name Tablet Size Instructions for use    Albuterol Sulfate (Aero Soln) albuterol 108 (90 Base) MCG/ACT INHALE 2 PUFFS ORALLY EVERY 6 HOURS IF NEEDED FOR SHORTNESS OF BREATH        BuPROPion HCl (TABLET SR 24 HR) WELLBUTRIN  MG Take 1 Tab by mouth every morning.        Chlorothiazide (Tab) DIURIL 500 MG TAKE 1 TABLET ORALLY EVERY MORNING        Cyclobenzaprine HCl (Tab) FLEXERIL 10 MG Take 1 Tab by mouth 3 times a day as needed. PAIN        Cyclobenzaprine HCl (Tab) FLEXERIL 10 MG TAKE 1 TABLET ORALLY 3 TIMES DAILY IF NEEDED FOR PAIN        Hydrocodone-Acetaminophen (Tab) NORCO  MG TAKE 1 TABLET ORALLY EVERY 8 HOURS IF NEEDED        Metoprolol Succinate (TABLET SR 24 HR) TOPROL XL 25 MG TAKE 1 TABLET ORALLY ONCE DAILY        SUMAtriptan Succinate (Tab) IMITREX 50 MG Take 1 Tab by mouth Once PRN for Migraine.        Umeclidinium-Vilanterol (AEROSOL POWDER, BREATH ACTIVATED) Umeclidinium-Vilanterol 62.5-25 MCG/INH Inhale 1 Puff by mouth every day.        .                 Medicines prescribed today were sent to:     [facility] [address] [address] [facility] [address].    [address] [place] [address]    Phone: [phone] Fax: [phone]    Open 24 Hours?: No    [facility] [address] [place] [address], [place] - [address]    [address] [place] [address]    Phone: [phone] Fax: [phone]    Open 24 Hours?: No      Medication refill instructions:       If your prescription bottle indicates you have medication refills left, it is not necessary to call your provider’s office. Please contact your pharmacy and they will refill your medication.    If your prescription bottle indicates you do not have any refills left, you may request refills at any time through one of the following ways: The online HubChilla system (except Urgent Care), by calling your provider’s office, or by asking your pharmacy to contact your provider’s office with a refill request. Medication refills are processed only during regular business hours and may not be available until the next business day. Your provider may request additional information or to have a follow-up visit with you prior to refilling your medication.   *Please Note: Medication refills are assigned a new Rx number when refilled electronically. Your pharmacy may indicate that no refills were authorized even though a new prescription for the same medication is available at the pharmacy. Please request the medicine by name with the pharmacy before contacting your provider for a refill.        Your To Do List     Future Labs/Procedures Complete By Expires    MA-SCREEN MAMMO W/CAD-BILAT  As directed 9/18/2018         HubChilla Status: Patient Declined        Quit Tobacco Information     Do you want to quit using tobacco?    Quitting tobacco decreases risks of cancer, heart and lung disease, increases life expectancy, improves sense of taste and smell, and increases spending money, among other benefits.    If you are thinking about quitting, we can help.  • [facility] Quit Tobacco Program: [phone]  o Program occurs weekly for four weeks and includes pharmacist consultation on products to support quitting smoking or chewing tobacco. A provider  referral is needed for pharmacist consultation.  • Tobacco Users Help Hotline: 9-487-QUIT-NOW (047-8974) or https://[place].quitlogix.org/  o Free, confidential telephone and online coaching for [place] residents. Sessions are designed on a schedule that is convenient for you. Eligible clients receive free nicotine replacement therapy.  • Nationally: www.smokefree.gov  o Information and professional assistance to support both immediate and long-term needs as you become, and remain, a non-smoker. Smokefree.gov allows you to choose the help that best fits your needs.

## 2017-08-18 NOTE — PROGRESS NOTES
"Subjective:      Susan Pastrana is a 62 y.o. female who presents with Follow-Up            HPI 1. Chronic lumbar pain-patient ports ongoing low back pain per take late on work days. 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 She reports back pain radiates around her right flank but does not radiate down into either lower extremity. Last dose of Norco 2 days ago    ROS negative for current constipation, fecal incontinence. Patient does note some stress urinary incontinence with sneezing.       Objective:     /76 mmHg  Pulse 80  Temp(Src) 36.3 °C (97.3 °F)  Resp 16  Ht 1.626 m (5' 4.02\")  Wt 83.915 kg (185 lb)  BMI 31.74 kg/m2  SpO2 94%  LMP 09/13/2007  Breastfeeding? No     Physical Exam  Gen.- alert, cooperative, in no acute distress  Neck- midline trachea, thyroid not enlarged or tender,supple, no cervical adenopathy  Chest-clear to auscultation and percussion with normal breath sounds. No retractions. Chest wall nontender  Cardiac- regular rhythm and rate. No murmur, thrill, or heave  Back-nontender to external palpation today (nonwork day). Indicated tenderness is from L3-S2 in the midline and the right parasacral areas.          Assessment/Plan:     1. Screening for malignant neoplasm of breast    - MA-SCREEN MAMMO W/CAD-BILAT; Future    2. Chronic low back pain without sciatica, unspecified back pain laterality        Plan: 1. Update urine drug screen-patient assures me there'll be no further residue of marijuana  2. May continue when necessary Flexeril  3. With anticipated appropriate urine drug screen will refill [place] without visit in one month  4. Screening mammogram reordered  "

## 2017-08-21 RX ORDER — BUPROPION HYDROCHLORIDE 300 MG/1
300 TABLET ORAL EVERY MORNING
Qty: 30 TAB | Refills: 3 | Status: SHIPPED | OUTPATIENT
Start: 2017-08-21 | End: 2017-10-23 | Stop reason: SDUPTHER

## 2017-08-21 RX ORDER — ALBUTEROL SULFATE 90 UG/1
AEROSOL, METERED RESPIRATORY (INHALATION)
Qty: 1 INHALER | Refills: 5 | Status: SHIPPED | OUTPATIENT
Start: 2017-08-21 | End: 2018-04-13 | Stop reason: SDUPTHER

## 2017-08-24 RX ORDER — HYDROCODONE BITARTRATE AND ACETAMINOPHEN 10; 325 MG/1; MG/1
TABLET ORAL
Qty: 45 TAB | Refills: 0 | Status: SHIPPED | OUTPATIENT
Start: 2017-08-24 | End: 2017-09-18 | Stop reason: SDUPTHER

## 2017-08-29 ENCOUNTER — TELEPHONE (OUTPATIENT)
Dept: MEDICAL GROUP | Facility: MEDICAL CENTER | Age: 62
End: 2017-08-29

## 2017-08-29 DIAGNOSIS — Z79.891 CHRONIC USE OF OPIATE DRUGS THERAPEUTIC PURPOSES: ICD-10-CM

## 2017-09-18 RX ORDER — HYDROCODONE BITARTRATE AND ACETAMINOPHEN 10; 325 MG/1; MG/1
TABLET ORAL
Qty: 45 TAB | Refills: 0 | Status: SHIPPED | OUTPATIENT
Start: 2017-09-18 | End: 2017-10-23 | Stop reason: SDUPTHER

## 2017-09-18 NOTE — TELEPHONE ENCOUNTER
Phone Number Called: [phone] (home)     Message: left message for patient to  Rx    Left Message for patient to call back: yes

## 2017-10-23 DIAGNOSIS — M62.838 MUSCLE SPASM: ICD-10-CM

## 2017-10-23 RX ORDER — FLUOXETINE HYDROCHLORIDE 20 MG/1
CAPSULE ORAL
Qty: 60 CAP | Refills: 4 | Status: SHIPPED | OUTPATIENT
Start: 2017-10-23 | End: 2018-04-13 | Stop reason: SDUPTHER

## 2017-10-23 RX ORDER — CYCLOBENZAPRINE HCL 10 MG
10 TABLET ORAL 3 TIMES DAILY PRN
Qty: 60 TAB | Refills: 4 | Status: SHIPPED | OUTPATIENT
Start: 2017-10-23 | End: 2018-04-13 | Stop reason: SDUPTHER

## 2017-10-23 RX ORDER — METOPROLOL SUCCINATE 25 MG/1
TABLET, EXTENDED RELEASE ORAL
Qty: 30 TAB | Refills: 10 | Status: SHIPPED | OUTPATIENT
Start: 2017-10-23 | End: 2018-10-23 | Stop reason: SDUPTHER

## 2017-10-23 RX ORDER — BUPROPION HYDROCHLORIDE 300 MG/1
300 TABLET ORAL EVERY MORNING
Qty: 30 TAB | Refills: 6 | Status: SHIPPED | OUTPATIENT
Start: 2017-10-23 | End: 2018-07-20 | Stop reason: SDUPTHER

## 2017-10-23 RX ORDER — HYDROCODONE BITARTRATE AND ACETAMINOPHEN 10; 325 MG/1; MG/1
TABLET ORAL
Qty: 45 TAB | Refills: 0 | Status: SHIPPED | OUTPATIENT
Start: 2017-10-23 | End: 2017-11-20 | Stop reason: SDUPTHER

## 2017-10-23 NOTE — TELEPHONE ENCOUNTER
Pt also requesting an Rx for fluoxetine. Pt has been informed of need for appt, she will come in and schedule the appt tomorrow.

## 2017-10-24 ENCOUNTER — NON-PROVIDER VISIT (OUTPATIENT)
Dept: MEDICAL GROUP | Facility: MEDICAL CENTER | Age: 62
End: 2017-10-24
Attending: FAMILY MEDICINE
Payer: MEDICAID

## 2017-10-24 DIAGNOSIS — Z23 NEED FOR INFLUENZA VACCINATION: ICD-10-CM

## 2017-10-24 PROCEDURE — 90471 IMMUNIZATION ADMIN: CPT | Performed by: FAMILY MEDICINE

## 2017-10-24 PROCEDURE — 90686 IIV4 VACC NO PRSV 0.5 ML IM: CPT | Performed by: FAMILY MEDICINE

## 2017-10-24 NOTE — PROGRESS NOTES
"Susan Pastrana is a 62 y.o. female here for a non-provider visit for:   FLU    Reason for immunization: Annual Flu Vaccine  Immunization records indicate need for vaccine: Yes, confirmed with Epic  Minimum interval has been met for this vaccine: Yes  ABN completed: Not Indicated    Order and dose verified by: LUIS MARTINEZ Dated  8/7/15 was given to patient: Yes  All IAC Questionnaire questions were answered \"No.\"    Patient tolerated injection and no adverse effects were observed or reported: Yes    Pt scheduled for next dose in series: Not Indicated    "

## 2017-12-21 DIAGNOSIS — M54.50 CHRONIC MIDLINE LOW BACK PAIN WITHOUT SCIATICA: ICD-10-CM

## 2017-12-21 DIAGNOSIS — G89.29 CHRONIC MIDLINE LOW BACK PAIN WITHOUT SCIATICA: ICD-10-CM

## 2017-12-21 RX ORDER — HYDROCODONE BITARTRATE AND ACETAMINOPHEN 10; 325 MG/1; MG/1
TABLET ORAL
Qty: 45 TAB | Refills: 0 | Status: SHIPPED | OUTPATIENT
Start: 2017-12-21 | End: 2018-01-20

## 2017-12-22 NOTE — TELEPHONE ENCOUNTER
Patient needs follow-up visit within 30 days. Please collect UA next 24 hours for UDS which does not appear to have been completed in August when it was ordered.

## 2017-12-29 RX ORDER — TIOTROPIUM BROMIDE 18 UG/1
18 CAPSULE ORAL; RESPIRATORY (INHALATION) DAILY
Qty: 30 CAP | Refills: 11 | Status: SHIPPED | OUTPATIENT
Start: 2017-12-29 | End: 2019-10-10

## 2018-01-18 ENCOUNTER — TELEPHONE (OUTPATIENT)
Dept: MEDICAL GROUP | Facility: MEDICAL CENTER | Age: 63
End: 2018-01-18

## 2018-02-02 DIAGNOSIS — M54.50 CHRONIC MIDLINE LOW BACK PAIN WITHOUT SCIATICA: ICD-10-CM

## 2018-02-02 DIAGNOSIS — G89.29 CHRONIC MIDLINE LOW BACK PAIN WITHOUT SCIATICA: ICD-10-CM

## 2018-02-02 RX ORDER — HYDROCODONE BITARTRATE AND ACETAMINOPHEN 10; 325 MG/1; MG/1
TABLET ORAL
Qty: 45 TAB | Refills: 0 | Status: SHIPPED | OUTPATIENT
Start: 2018-02-02 | End: 2018-03-19 | Stop reason: SDUPTHER

## 2018-02-02 NOTE — TELEPHONE ENCOUNTER
Patient will need to be seen within the next 30 days and complete a new urine drug screen to receive further pain medication after this prescription.

## 2018-02-02 NOTE — TELEPHONE ENCOUNTER
Patient was advice to make an appointment before next refill for pain meds. She will call back after she gets her schedule.

## 2018-03-09 RX ORDER — HYDROCODONE BITARTRATE AND ACETAMINOPHEN 10; 325 MG/1; MG/1
TABLET ORAL
Qty: 45 TAB | Refills: 0 | OUTPATIENT
Start: 2018-03-09 | End: 2018-04-08

## 2018-03-19 ENCOUNTER — OFFICE VISIT (OUTPATIENT)
Dept: MEDICAL GROUP | Facility: MEDICAL CENTER | Age: 63
End: 2018-03-19
Attending: FAMILY MEDICINE
Payer: MEDICAID

## 2018-03-19 VITALS
HEART RATE: 88 BPM | HEIGHT: 64 IN | RESPIRATION RATE: 16 BRPM | DIASTOLIC BLOOD PRESSURE: 78 MMHG | WEIGHT: 203 LBS | BODY MASS INDEX: 34.66 KG/M2 | OXYGEN SATURATION: 95 % | TEMPERATURE: 97.9 F | SYSTOLIC BLOOD PRESSURE: 120 MMHG

## 2018-03-19 DIAGNOSIS — M54.40 CHRONIC LOW BACK PAIN WITH SCIATICA, SCIATICA LATERALITY UNSPECIFIED, UNSPECIFIED BACK PAIN LATERALITY: ICD-10-CM

## 2018-03-19 DIAGNOSIS — M54.50 CHRONIC MIDLINE LOW BACK PAIN WITHOUT SCIATICA: ICD-10-CM

## 2018-03-19 DIAGNOSIS — G89.29 CHRONIC LOW BACK PAIN WITH SCIATICA, SCIATICA LATERALITY UNSPECIFIED, UNSPECIFIED BACK PAIN LATERALITY: ICD-10-CM

## 2018-03-19 DIAGNOSIS — Z79.891 CHRONIC USE OF OPIATE DRUGS THERAPEUTIC PURPOSES: ICD-10-CM

## 2018-03-19 DIAGNOSIS — G89.29 CHRONIC MIDLINE LOW BACK PAIN WITHOUT SCIATICA: ICD-10-CM

## 2018-03-19 PROCEDURE — 99213 OFFICE O/P EST LOW 20 MIN: CPT | Performed by: NURSE PRACTITIONER

## 2018-03-19 PROCEDURE — 99213 OFFICE O/P EST LOW 20 MIN: CPT | Performed by: FAMILY MEDICINE

## 2018-03-19 RX ORDER — HYDROCODONE BITARTRATE AND ACETAMINOPHEN 10; 325 MG/1; MG/1
1 TABLET ORAL EVERY 8 HOURS PRN
Qty: 45 TAB | Refills: 0 | Status: SHIPPED | OUTPATIENT
Start: 2018-03-19 | End: 2018-04-13 | Stop reason: SDUPTHER

## 2018-03-19 ASSESSMENT — PAIN SCALES - GENERAL: PAINLEVEL: 6=MODERATE PAIN

## 2018-03-19 NOTE — PROGRESS NOTES
Chief Complaint:   Chief Complaint   Patient presents with   • Medication Refill       HPI:  Susan is here today for Med REfill.  Her PCP, Dr Valenzuela is not available to see her today.      Her PMH:  Anxiety  Chronic LBP  Chronic use of opiates for therapeutic purposes  Elev LFT's  HTN  Fatty Liver  Smoking    [place]  Report:   2/5/18 Norco 10/325 # 45 by Dr Valenzuela  Similar entries in report- 11 RX and 3 Prescribers.    Review of Records:  8/18/17 Clinic for chronic LBP into right flank.  RX Norco and Flexeril  6/12/17 Anxiety , RX Wellbutrin and Norco for chronic pain      Chronic use of opiate drugs therapeutic purposes  Pt has known chronic pain and typically gets her opiate RX from Dr Valenzuela but he is unavailable this week.  She reports she ran out of her Norco 10's early in March as was having difficulty getting appt here at clinic.  Discussed planning appt's in advance.    Chronic low back pain  Pt has hx of chronic LBP.  She reports is taking Flexeril and Norco. Ran out of [place]  States when ran out was worse to left side.  Denies Saddle PAresthesia or loss of bowel or bladder control.      Patient Active Problem List    Diagnosis Date Noted   • Chronic use of opiate drugs therapeutic purposes 02/17/2017   • COPD (chronic obstructive pulmonary disease) (CMS-HCC) 02/17/2017   • Essential hypertension 12/08/2015   • Elevated liver enzymes 12/01/2014   • Fatty liver 12/01/2014   • Chronic low back pain 12/01/2014   • Acute right flank pain 11/03/2014   • Anxiety 10/21/2014       Allergies:Patient has no known allergies.    Medicines as of today:  Current Outpatient Prescriptions   Medication Sig Dispense Refill   • HYDROcodone/acetaminophen (NORCO)  MG Tab Take 1 Tab by mouth every 8 hours as needed for up to 30 days. 45 Tab 0   • Tiotropium Bromide Monohydrate (SPIRIVA RESPIMAT) 2.5 MCG/ACT Aero Soln Inhale 1 Puff by mouth every day. 1 Inhaler 3   • tiotropium (SPIRIVA) 18 MCG Cap Inhale 1 Cap by  "mouth every day. 30 Cap 11   • cyclobenzaprine (FLEXERIL) 10 MG Tab Take 1 Tab by mouth 3 times a day as needed. PAIN 60 Tab 4   • buPROPion (WELLBUTRIN XL) 300 MG XL tablet Take 1 Tab by mouth every morning. 30 Tab 6   • metoprolol SR (TOPROL XL) 25 MG TABLET SR 24 HR TAKE 1 TABLET ORALLY ONCE DAILY 30 Tab 10   • fluoxetine (PROZAC) 20 MG Cap TAKE 2 CAPSULES ORALLY ONCE DAILY 60 Cap 4   • albuterol (VENTOLIN HFA) 108 (90 Base) MCG/ACT Aero Soln inhalation aerosol INHALE 2 PUFFS ORALLY EVERY 6 HOURS IF NEEDED FOR SHORTNESS OF BREATH 1 Inhaler 5   • cyclobenzaprine (FLEXERIL) 10 MG Tab TAKE 1 TABLET ORALLY 3 TIMES DAILY IF NEEDED FOR PAIN 60 Tab 5   • chlorothiazide (DIURIL) 500 MG tablet TAKE 1 TABLET ORALLY EVERY MORNING 30 Tab 6   • Umeclidinium-Vilanterol (ANORO ELLIPTA) 62.5-25 MCG/INH AEROSOL POWDER, BREATH ACTIVATED Inhale 1 Puff by mouth every day. 1 Each 6   • sumatriptan (IMITREX) 50 MG TABS Take 1 Tab by mouth Once PRN for Migraine. 5 Each 11     No current facility-administered medications for this visit.        Social History   Substance Use Topics   • Smoking status: Current Every Day Smoker     Packs/day: 0.25     Types: Cigarettes   • Smokeless tobacco: Never Used   • Alcohol use 0.0 oz/week      Comment: occ       Past Medical History:   Diagnosis Date   • Hypertension    • Migraine        Family History   Problem Relation Age of Onset   • Cancer Mother      stomach   • Diabetes Father    • Cancer Father      prostate   • Diabetes Paternal Grandmother    • Heart Disease Paternal Grandmother    • Stroke Neg Hx        ROS:  Review of Systems   See HPI Above    Exam:  Blood pressure 120/78, pulse 88, temperature 36.6 °C (97.9 °F), resp. rate 16, height 1.626 m (5' 4.02\"), weight 92.1 kg (203 lb), last menstrual period 09/13/2007, SpO2 95 %. Body mass index is 34.83 kg/m².    General:  Well nourished, well developed female in NAD  HENT:Head is grossly normal.  Neck: Supple. Trachea is " midline.  Pulmonary: .  Normal effort.  Cardiovascular: Regular rate and rhythm.  Abdomen-Abdomen is soft  Upper extremities- . Good ROM  Lower extremities- neg for edema.  Neuro- A & O x 4. Speech clear and appropriate.    Current medications, allergies, and problem list reviewed with patient and updated in EPIC today.    Assessment/Plan:  1. Chronic use of opiate drugs therapeutic purposes  HYDROcodone/acetaminophen (NORCO)  MG Tab  F/u PCP, Dr Valenzuela within 30 days.   2. Chronic low back pain with sciatica, sciatica laterality unspecified, unspecified back pain laterality  Continue medications. Heat and gentle stretching   3. Chronic midline low back pain without sciatica  HYDROcodone/acetaminophen (NORCO)  MG Tab       Return in about 4 weeks (around 4/16/2018) for PCP f/u, Pain Med F/U.

## 2018-03-19 NOTE — ASSESSMENT & PLAN NOTE
Pt has known chronic pain and typically gets her opiate RX from Dr Valenzuela but he is unavailable this week.  She reports

## 2018-03-19 NOTE — ASSESSMENT & PLAN NOTE
Pt has hx of chronic LBP.  She reports is taking Flexeril and Norco. Ran out of [place]  States when ran out was worse to left side..

## 2018-04-13 DIAGNOSIS — G89.29 CHRONIC MIDLINE LOW BACK PAIN WITHOUT SCIATICA: ICD-10-CM

## 2018-04-13 DIAGNOSIS — M62.838 MUSCLE SPASM: ICD-10-CM

## 2018-04-13 DIAGNOSIS — M54.50 CHRONIC MIDLINE LOW BACK PAIN WITHOUT SCIATICA: ICD-10-CM

## 2018-04-13 DIAGNOSIS — Z79.891 CHRONIC USE OF OPIATE DRUGS THERAPEUTIC PURPOSES: ICD-10-CM

## 2018-04-13 RX ORDER — ALBUTEROL SULFATE 90 UG/1
AEROSOL, METERED RESPIRATORY (INHALATION)
Qty: 1 INHALER | Refills: 5 | Status: SHIPPED | OUTPATIENT
Start: 2018-04-13 | End: 2018-10-23 | Stop reason: SDUPTHER

## 2018-04-13 RX ORDER — CYCLOBENZAPRINE HCL 10 MG
TABLET ORAL
Qty: 60 TAB | Refills: 2 | Status: SHIPPED | OUTPATIENT
Start: 2018-04-13 | End: 2018-07-20 | Stop reason: SDUPTHER

## 2018-04-13 RX ORDER — FLUOXETINE HYDROCHLORIDE 20 MG/1
CAPSULE ORAL
Qty: 60 CAP | Refills: 2 | Status: SHIPPED | OUTPATIENT
Start: 2018-04-13 | End: 2018-07-20 | Stop reason: SDUPTHER

## 2018-04-13 RX ORDER — HYDROCODONE BITARTRATE AND ACETAMINOPHEN 10; 325 MG/1; MG/1
TABLET ORAL
Qty: 45 TAB | Refills: 0 | Status: SHIPPED | OUTPATIENT
Start: 2018-04-16 | End: 2018-05-16

## 2018-06-25 ENCOUNTER — HOSPITAL ENCOUNTER (OUTPATIENT)
Facility: MEDICAL CENTER | Age: 63
End: 2018-06-25
Attending: FAMILY MEDICINE
Payer: MEDICAID

## 2018-06-25 ENCOUNTER — OFFICE VISIT (OUTPATIENT)
Dept: MEDICAL GROUP | Facility: MEDICAL CENTER | Age: 63
End: 2018-06-25
Attending: FAMILY MEDICINE
Payer: MEDICAID

## 2018-06-25 VITALS
RESPIRATION RATE: 16 BRPM | OXYGEN SATURATION: 91 % | SYSTOLIC BLOOD PRESSURE: 112 MMHG | TEMPERATURE: 97 F | HEIGHT: 64 IN | WEIGHT: 205 LBS | DIASTOLIC BLOOD PRESSURE: 80 MMHG | BODY MASS INDEX: 35 KG/M2 | HEART RATE: 88 BPM

## 2018-06-25 DIAGNOSIS — H91.93 DECREASED HEARING OF BOTH EARS: ICD-10-CM

## 2018-06-25 DIAGNOSIS — E66.9 OBESITY (BMI 30-39.9): ICD-10-CM

## 2018-06-25 DIAGNOSIS — G89.29 CHRONIC LOW BACK PAIN WITHOUT SCIATICA, UNSPECIFIED BACK PAIN LATERALITY: ICD-10-CM

## 2018-06-25 DIAGNOSIS — M54.50 CHRONIC LOW BACK PAIN WITHOUT SCIATICA, UNSPECIFIED BACK PAIN LATERALITY: ICD-10-CM

## 2018-06-25 DIAGNOSIS — Z12.39 SCREENING FOR MALIGNANT NEOPLASM OF BREAST: ICD-10-CM

## 2018-06-25 DIAGNOSIS — Z12.11 SCREENING FOR MALIGNANT NEOPLASM OF COLON: ICD-10-CM

## 2018-06-25 PROCEDURE — 80307 DRUG TEST PRSMV CHEM ANLYZR: CPT

## 2018-06-25 PROCEDURE — 99213 OFFICE O/P EST LOW 20 MIN: CPT | Performed by: FAMILY MEDICINE

## 2018-06-25 PROCEDURE — G0480 DRUG TEST DEF 1-7 CLASSES: HCPCS

## 2018-06-25 RX ORDER — HYDROCODONE BITARTRATE AND ACETAMINOPHEN 10; 325 MG/1; MG/1
TABLET ORAL
Qty: 45 TAB | Refills: 0 | Status: SHIPPED | OUTPATIENT
Start: 2018-06-25 | End: 2018-07-20 | Stop reason: SDUPTHER

## 2018-06-25 ASSESSMENT — PAIN SCALES - GENERAL: PAINLEVEL: NO PAIN

## 2018-06-27 LAB
AMPHET CTO UR CFM-MCNC: POSITIVE NG/ML
BARBITURATES CTO UR CFM-MCNC: NEGATIVE NG/ML
BENZODIAZ CTO UR CFM-MCNC: NEGATIVE NG/ML
BUPRENORPHINE UR-MCNC: NEGATIVE NG/ML
CANNABINOIDS CTO UR CFM-MCNC: NEGATIVE NG/ML
CARISOPRODOL UR-MCNC: NEGATIVE NG/ML
COCAINE CTO UR CFM-MCNC: NEGATIVE NG/ML
DRUG SCREEN COMMENT UR-IMP: NORMAL
ETHYL GLUCURONIDE UR QL SCN: NEGATIVE NG/ML
FENTANYL UR-MCNC: NEGATIVE NG/ML
MEPERIDINE CTO UR SCN-MCNC: NEGATIVE NG/ML
METHADONE CTO UR CFM-MCNC: NEGATIVE NG/ML
OPIATES UR QL SCN: POSITIVE NG/ML
OXYCDOXYM URSCRN 2005102: NEGATIVE NG/ML
PCP CTO UR CFM-MCNC: NEGATIVE NG/ML
PROPOXYPH CTO UR CFM-MCNC: NEGATIVE NG/ML
TAPENTADOL UR-MCNC: NEGATIVE NG/ML
TRAMADOL CTO UR SCN-MCNC: NEGATIVE NG/ML
ZOLPIDEM UR-MCNC: NEGATIVE NG/ML

## 2018-06-30 LAB
6MAM UR CFM-MCNC: <10 NG/ML
AMPHET UR CFM-MCNC: <200 NG/ML
CODEINE UR CFM-MCNC: <20 NG/ML
HYDROCODONE UR CFM-MCNC: 561 NG/ML
HYDROMORPHONE UR CFM-MCNC: <20 NG/ML
MDA UR CFM-MCNC: <200 NG/ML
MDEA UR CFM-MCNC: <200 NG/ML
MDMA UR CFM-MCNC: <200 NG/ML
METHAMPHET UR CFM-MCNC: <200 NG/ML
MORPHINE UR CFM-MCNC: <20 NG/ML
NORHYDROCODONE UR CFM-MCNC: 782 NG/ML
NOROXYCODONE UR CFM-MCNC: <20 NG/ML
OPIATES UR NOROXYM Q0836: <20 NG/ML
OXYCODONE UR CFM-MCNC: <20 NG/ML
OXYMORPHONE UR CFM-MCNC: <20 NG/ML

## 2018-07-03 ENCOUNTER — TELEPHONE (OUTPATIENT)
Dept: MEDICAL GROUP | Facility: MEDICAL CENTER | Age: 63
End: 2018-07-03

## 2018-07-03 NOTE — TELEPHONE ENCOUNTER
----- Message from Yoan Valenzuela M.D. sent at 7/2/2018  1:02 PM PDT -----  Final urine drug screen is consistent with medications ordered

## 2018-07-03 NOTE — TELEPHONE ENCOUNTER
Phone Number Called: [phone] (home)     Message: Pt called back notified of results below.     Left Message for patient to call back: N\A

## 2018-07-03 NOTE — TELEPHONE ENCOUNTER
Phone Number Called: [phone] (home)     Message: Unable to reach pt left vm to call back regarding results.     Left Message for patient to call back: yes

## 2018-08-22 DIAGNOSIS — M54.40 CHRONIC LOW BACK PAIN WITH SCIATICA, SCIATICA LATERALITY UNSPECIFIED, UNSPECIFIED BACK PAIN LATERALITY: ICD-10-CM

## 2018-08-22 DIAGNOSIS — G89.29 CHRONIC LOW BACK PAIN WITH SCIATICA, SCIATICA LATERALITY UNSPECIFIED, UNSPECIFIED BACK PAIN LATERALITY: ICD-10-CM

## 2018-08-23 RX ORDER — HYDROCODONE BITARTRATE AND ACETAMINOPHEN 10; 325 MG/1; MG/1
TABLET ORAL
Qty: 45 TAB | Refills: 0 | Status: SHIPPED | OUTPATIENT
Start: 2018-08-23 | End: 2018-09-21 | Stop reason: SDUPTHER

## 2018-09-10 ENCOUNTER — HOSPITAL ENCOUNTER (OUTPATIENT)
Dept: RADIOLOGY | Facility: MEDICAL CENTER | Age: 63
End: 2018-09-10
Attending: FAMILY MEDICINE
Payer: MEDICAID

## 2018-09-10 DIAGNOSIS — Z12.39 SCREENING FOR MALIGNANT NEOPLASM OF BREAST: ICD-10-CM

## 2018-09-10 PROCEDURE — 77067 SCR MAMMO BI INCL CAD: CPT

## 2018-09-12 ENCOUNTER — TELEPHONE (OUTPATIENT)
Dept: MEDICAL GROUP | Facility: MEDICAL CENTER | Age: 63
End: 2018-09-12

## 2018-09-12 NOTE — TELEPHONE ENCOUNTER
1. Caller Name: Susan                                         Call Back Number: [phone]      Patient approves a detailed voicemail message: yes      Pt called and requested a PA to be done for her HYDROcodone/acetaminophen (NORCO)  MG Tab. Pt stated she has been trying to get in contact with you and so has her pharmacy in regards to this. Pt only received half of her tabs and needed a PA to receive the other half, according to her pharmacy. I let Pt know that Bianca was out of the office on Wednesday's but that I would send a message and we would get back to her.

## 2018-09-14 ENCOUNTER — TELEPHONE (OUTPATIENT)
Dept: MEDICAL GROUP | Facility: MEDICAL CENTER | Age: 63
End: 2018-09-14

## 2018-09-15 NOTE — TELEPHONE ENCOUNTER
Phone Number Called: [phone] (home)       Message: Left vm with results.    Left Message for patient to call back: no

## 2018-09-15 NOTE — TELEPHONE ENCOUNTER
----- Message from Yoan Valenzuela M.D. sent at 9/13/2018  6:55 PM PDT -----  Mammogram results are normal. Please repeat exam in 1 year.

## 2018-11-26 DIAGNOSIS — G89.29 OTHER CHRONIC BACK PAIN: ICD-10-CM

## 2018-11-26 DIAGNOSIS — M54.9 OTHER CHRONIC BACK PAIN: ICD-10-CM

## 2018-11-27 RX ORDER — HYDROCODONE BITARTRATE AND ACETAMINOPHEN 10; 325 MG/1; MG/1
TABLET ORAL
Qty: 45 TAB | Refills: 0 | OUTPATIENT
Start: 2018-11-27 | End: 2018-12-27

## 2018-11-29 RX ORDER — HYDROCODONE BITARTRATE AND ACETAMINOPHEN 10; 325 MG/1; MG/1
TABLET ORAL
Qty: 45 TAB | Refills: 0 | OUTPATIENT
Start: 2018-11-29 | End: 2018-12-29

## 2018-11-29 NOTE — TELEPHONE ENCOUNTER
Pt will not have insurance until January. She is asking if you would be able to give her 1 more Rx.

## 2019-03-25 RX ORDER — METOPROLOL SUCCINATE 25 MG/1
TABLET, EXTENDED RELEASE ORAL
Qty: 30 TAB | Refills: 2 | Status: SHIPPED | OUTPATIENT
Start: 2019-03-25 | End: 2019-10-10

## 2019-04-26 DIAGNOSIS — M62.838 MUSCLE SPASM: ICD-10-CM

## 2019-04-26 RX ORDER — BUPROPION HYDROCHLORIDE 300 MG/1
TABLET ORAL
Qty: 30 TAB | Refills: 1 | Status: SHIPPED | OUTPATIENT
Start: 2019-04-26 | End: 2019-07-31 | Stop reason: SDUPTHER

## 2019-04-26 RX ORDER — CYCLOBENZAPRINE HCL 10 MG
TABLET ORAL
Qty: 60 TAB | Refills: 1 | Status: SHIPPED | OUTPATIENT
Start: 2019-04-26 | End: 2019-06-05 | Stop reason: SDUPTHER

## 2019-04-26 NOTE — TELEPHONE ENCOUNTER
2Was the patient seen in the last year in this department? Yes    Does patient have an active prescription for medications requested? No     Received Request Via: Pharmacy

## 2019-09-17 DIAGNOSIS — M62.838 MUSCLE SPASM: ICD-10-CM

## 2019-09-21 ENCOUNTER — APPOINTMENT (OUTPATIENT)
Dept: RADIOLOGY | Facility: IMAGING CENTER | Age: 64
End: 2019-09-21
Attending: PHYSICIAN ASSISTANT
Payer: COMMERCIAL

## 2019-09-21 ENCOUNTER — OFFICE VISIT (OUTPATIENT)
Dept: URGENT CARE | Facility: CLINIC | Age: 64
End: 2019-09-21
Payer: COMMERCIAL

## 2019-09-21 VITALS
DIASTOLIC BLOOD PRESSURE: 74 MMHG | WEIGHT: 207 LBS | OXYGEN SATURATION: 98 % | BODY MASS INDEX: 35.34 KG/M2 | TEMPERATURE: 98.3 F | RESPIRATION RATE: 14 BRPM | HEIGHT: 64 IN | SYSTOLIC BLOOD PRESSURE: 126 MMHG | HEART RATE: 74 BPM

## 2019-09-21 DIAGNOSIS — S46.911A STRAIN OF RIGHT SHOULDER, INITIAL ENCOUNTER: ICD-10-CM

## 2019-09-21 PROCEDURE — 73030 X-RAY EXAM OF SHOULDER: CPT | Mod: TC,RT | Performed by: PHYSICIAN ASSISTANT

## 2019-09-21 PROCEDURE — 99203 OFFICE O/P NEW LOW 30 MIN: CPT | Performed by: PHYSICIAN ASSISTANT

## 2019-09-21 RX ORDER — METHYLPREDNISOLONE 4 MG/1
TABLET ORAL
Qty: 1 KIT | Refills: 0 | Status: SHIPPED | OUTPATIENT
Start: 2019-09-21 | End: 2019-10-10

## 2019-09-21 ASSESSMENT — ENCOUNTER SYMPTOMS
WHEEZING: 0
BACK PAIN: 0
NECK PAIN: 0
NAUSEA: 0
COUGH: 0
VOMITING: 0
SENSORY CHANGE: 0
CHILLS: 0
FEVER: 0
SPUTUM PRODUCTION: 0
TINGLING: 0
SHORTNESS OF BREATH: 0

## 2019-09-21 NOTE — PROGRESS NOTES
Subjective:     Susan Pastrana is a 64 y.o. female who presents for Arm Injury ((R) arm pain after falling out of bed couple weeks ago)       Patient states 2 weeks status post a fall out of bed during her particularly fitful dream.  She notes she impacted right shoulder on the carpeted floor adjacent to her bed.  She has had some pain with overhead range of motion since then.  She particularly noticed this with overhead activity such as drying her hair.  She denies catching or locking.  She notes some sensation of weakness with  strength of her hands but denies numbness tingling to her hand.  Denies history of surgery to the shoulder.  Complains of some mild pain to the front of the shoulder near her collarbone.  Denies deformity.  Notes some radiculopathy down arm near trapezius.  Denies other injuries at time of fall.  Denies head neck or back pain or injuries.    Arm Injury   This is a new problem. The current episode started 1 to 4 weeks ago. Pertinent negatives include no chills, coughing, fever, nausea, neck pain, rash or vomiting.     Past Medical History:   Diagnosis Date   • Hypertension    • Migraine      Past Surgical History:   Procedure Laterality Date   • ABDOMINAL HYSTERECTOMY TOTAL  age 31    partial hysterectomy    • TONSILLECTOMY     • VEIN STRIPPING       Social History     Socioeconomic History   • Marital status: Single     Spouse name: Not on file   • Number of children: Not on file   • Years of education: Not on file   • Highest education level: Not on file   Occupational History   • Not on file   Social Needs   • Financial resource strain: Not on file   • Food insecurity:     Worry: Not on file     Inability: Not on file   • Transportation needs:     Medical: Not on file     Non-medical: Not on file   Tobacco Use   • Smoking status: Current Every Day Smoker     Packs/day: 0.25     Types: Cigarettes   • Smokeless tobacco: Never Used   Substance and Sexual Activity   • Alcohol use: Yes  "    Alcohol/week: 0.0 oz     Comment: occ   • Drug use: No   • Sexual activity: Yes     Partners: Male     Comment: ,    Lifestyle   • Physical activity:     Days per week: Not on file     Minutes per session: Not on file   • Stress: Not on file   Relationships   • Social connections:     Talks on phone: Not on file     Gets together: Not on file     Attends Hindu service: Not on file     Active member of club or organization: Not on file     Attends meetings of clubs or organizations: Not on file     Relationship status: Not on file   • Intimate partner violence:     Fear of current or ex partner: Not on file     Emotionally abused: Not on file     Physically abused: Not on file     Forced sexual activity: Not on file   Other Topics Concern   • Not on file   Social History Narrative   • Not on file      Family History   Problem Relation Age of Onset   • Cancer Mother         stomach   • Diabetes Father    • Cancer Father         prostate   • Diabetes Paternal Grandmother    • Heart Disease Paternal Grandmother    • Stroke Neg Hx     Review of Systems   Constitutional: Negative for chills and fever.   Respiratory: Negative for cough, sputum production, shortness of breath and wheezing.    Gastrointestinal: Negative for nausea and vomiting.   Musculoskeletal: Positive for joint pain ( right shoulder). Negative for back pain and neck pain.   Skin: Negative for rash.   Neurological: Negative for tingling and sensory change.   No Known Allergies   Objective:   /74 (BP Location: Left arm, Patient Position: Sitting, BP Cuff Size: Large adult)   Pulse 74   Temp 36.8 °C (98.3 °F) (Temporal)   Resp 14   Ht 1.626 m (5' 4\")   Wt 93.9 kg (207 lb)   LMP 09/13/2007   SpO2 98%   BMI 35.53 kg/m²   Physical Exam   Constitutional: She is oriented to person, place, and time. She appears well-developed and well-nourished. No distress.   HENT:   Head: Normocephalic and atraumatic.   Right Ear: " External ear normal.   Left Ear: External ear normal.   Nose: Nose normal.   Eyes: Conjunctivae and lids are normal. Right eye exhibits no discharge. Left eye exhibits no discharge. No scleral icterus.   Neck: Neck supple.   Pulmonary/Chest: Effort normal. No respiratory distress.   Musculoskeletal: Normal range of motion.   Right shoulder grossly normal no erythema ecchymosis or effusions, full active range of motion with some limited range and complaints of pain overhead, rotator cuff strength 5 out of 5, mild distal clavicle tenderness to palpation, deltoid tenderness to palpation, normal sensation in all distributions to bilateral hands, interosseous strength 5 out of 5, +2 radial pulses bilaterally   Neurological: She is alert and oriented to person, place, and time. She is not disoriented.   Skin: Skin is warm and dry. She is not diaphoretic. No erythema. No pallor.   Psychiatric: Her speech is normal and behavior is normal.   Nursing note and vitals reviewed.  dx shoulder -   Impression       Unremarkable shoulder series.            INTERPRETING LOCATION:  [address] [place] [address], [facility]            Last Resulted: 09/21/19 11:41 AM              Assessment/Plan:   Assessment    1. Strain of right shoulder, initial encounter  - DX-SHOULDER 2+ RIGHT; Future  - methylPREDNISolone (MEDROL DOSEPAK) 4 MG Tablet Therapy Pack; Take as directed on package.  Dispense one package.  Dispense: 1 Kit; Refill: 0  Recommend conservative care, rest, ice/heat, work on gentle ROM exercises, sent w/ stretches  Return to clinic with lack of resolution or progression of symptoms.  Cautioned regarding potential side effects of steroid, avoid nsaids while using  Due to patient already use of narcotics and muscle relaxants were used corticosteroid to attempt to offer pain & inflammation relief  Differential diagnosis, natural history, supportive care, and indications for immediate follow-up discussed.

## 2019-09-23 RX ORDER — BUPROPION HYDROCHLORIDE 300 MG/1
300 TABLET ORAL
Qty: 30 TAB | Refills: 0 | OUTPATIENT
Start: 2019-09-23

## 2019-09-23 RX ORDER — CYCLOBENZAPRINE HCL 10 MG
TABLET ORAL
Qty: 60 TAB | Refills: 0 | OUTPATIENT
Start: 2019-09-23

## 2019-09-23 RX ORDER — METOPROLOL SUCCINATE 25 MG/1
TABLET, EXTENDED RELEASE ORAL
Qty: 30 TAB | Refills: 2 | OUTPATIENT
Start: 2019-09-23

## 2019-10-10 ENCOUNTER — OFFICE VISIT (OUTPATIENT)
Dept: MEDICAL GROUP | Facility: MEDICAL CENTER | Age: 64
End: 2019-10-10
Payer: COMMERCIAL

## 2019-10-10 VITALS
HEIGHT: 64 IN | TEMPERATURE: 97.5 F | RESPIRATION RATE: 16 BRPM | HEART RATE: 109 BPM | DIASTOLIC BLOOD PRESSURE: 80 MMHG | SYSTOLIC BLOOD PRESSURE: 122 MMHG | WEIGHT: 210 LBS | BODY MASS INDEX: 35.85 KG/M2 | OXYGEN SATURATION: 93 %

## 2019-10-10 DIAGNOSIS — Z13.6 SCREENING FOR CARDIOVASCULAR CONDITION: ICD-10-CM

## 2019-10-10 DIAGNOSIS — J44.9 CHRONIC OBSTRUCTIVE PULMONARY DISEASE, UNSPECIFIED COPD TYPE (HCC): ICD-10-CM

## 2019-10-10 DIAGNOSIS — Z76.89 ENCOUNTER TO ESTABLISH CARE: ICD-10-CM

## 2019-10-10 DIAGNOSIS — F33.0 MILD EPISODE OF RECURRENT MAJOR DEPRESSIVE DISORDER (HCC): ICD-10-CM

## 2019-10-10 DIAGNOSIS — Z11.59 ENCOUNTER FOR HEPATITIS C SCREENING TEST FOR LOW RISK PATIENT: ICD-10-CM

## 2019-10-10 DIAGNOSIS — E66.9 OBESITY (BMI 35.0-39.9 WITHOUT COMORBIDITY): ICD-10-CM

## 2019-10-10 DIAGNOSIS — Z12.39 SCREENING FOR MALIGNANT NEOPLASM OF BREAST: ICD-10-CM

## 2019-10-10 DIAGNOSIS — F41.1 GAD (GENERALIZED ANXIETY DISORDER): ICD-10-CM

## 2019-10-10 DIAGNOSIS — I10 ESSENTIAL HYPERTENSION: ICD-10-CM

## 2019-10-10 DIAGNOSIS — M77.8 RIGHT SHOULDER TENDINITIS: ICD-10-CM

## 2019-10-10 PROCEDURE — 99214 OFFICE O/P EST MOD 30 MIN: CPT | Performed by: NURSE PRACTITIONER

## 2019-10-10 RX ORDER — METOPROLOL SUCCINATE 25 MG/1
25 TABLET, EXTENDED RELEASE ORAL DAILY
Qty: 30 TAB | Refills: 6 | Status: SHIPPED | OUTPATIENT
Start: 2019-10-10 | End: 2020-06-12

## 2019-10-10 RX ORDER — CHLORTHALIDONE 25 MG/1
12.5 TABLET ORAL DAILY
Qty: 30 TAB | Refills: 3 | Status: SHIPPED | OUTPATIENT
Start: 2019-10-10

## 2019-10-10 RX ORDER — ALBUTEROL SULFATE 90 UG/1
1-2 AEROSOL, METERED RESPIRATORY (INHALATION) EVERY 6 HOURS PRN
Qty: 1 INHALER | Refills: 6 | Status: SHIPPED | OUTPATIENT
Start: 2019-10-10

## 2019-10-10 RX ORDER — IBUPROFEN 800 MG/1
800 TABLET ORAL EVERY 8 HOURS PRN
Qty: 90 TAB | Refills: 2 | Status: SHIPPED | OUTPATIENT
Start: 2019-10-10 | End: 2020-02-07

## 2019-10-10 RX ORDER — FLUOXETINE HYDROCHLORIDE 20 MG/1
40 CAPSULE ORAL DAILY
Qty: 60 CAP | Refills: 6 | Status: SHIPPED | OUTPATIENT
Start: 2019-10-10 | End: 2021-03-29

## 2019-10-10 RX ORDER — BUDESONIDE AND FORMOTEROL FUMARATE DIHYDRATE 80; 4.5 UG/1; UG/1
2 AEROSOL RESPIRATORY (INHALATION) 2 TIMES DAILY
Qty: 1 INHALER | Refills: 6 | Status: SHIPPED | OUTPATIENT
Start: 2019-10-10

## 2019-10-10 RX ORDER — BUPROPION HYDROCHLORIDE 300 MG/1
300 TABLET ORAL EVERY MORNING
Qty: 30 TAB | Refills: 6 | Status: SHIPPED | OUTPATIENT
Start: 2019-10-10 | End: 2020-04-07

## 2019-10-10 ASSESSMENT — PATIENT HEALTH QUESTIONNAIRE - PHQ9: CLINICAL INTERPRETATION OF PHQ2 SCORE: 0

## 2019-10-10 NOTE — PROGRESS NOTES
"CC: Establish care/right shoulder pain/medication refill      Susan Pastrana is a 64 y.o. female here to establish care and to discuss the evaluation and management of:    Patient here today to establish care.     Former PCP: Dr. Calle    Patient presents today to discuss : Right shoulder pain, medication refill    1. Right shoulder tendinitis  Patient states she continues to have shoulder pain.  She was recently seen in the urgent care on September 21 with reports that she had fallen out of her bed about 2 weeks prior.  X-ray without any significant findings or fractures.  Patient states that she does not want to go to therapy although she was recommended from urgent care.  She is requesting narcotics.  She does have good range of motion somewhat tenderness around her supraspinous.  No crepitus, no bruising.    2. Mild episode of recurrent major depressive disorder (HCC)  Has not taken any medications for about 1 month.  Was on Wellbutrin for this.  Would like to get back on it.    3. Chronic obstructive pulmonary disease, unspecified COPD type (HCC)  Has been taking Spiriva Respimat and albuterol rescue inhaler.  Patient states that she continues to have a cough and wheezing.  States that this is more prominent at night.  States she takes her rescue inhaler almost daily.  Has not had pulmonary function test in quite some time.  She is a former smoker about 1.5 years ago.  She has not been on any inhaled corticosteroid.    4. Essential hypertension  She has not taken any of her medications in over 1 month for this.  Denies any chest pain, shortness of breath or dizziness.  States she was taking chlorothiazide 500mg and was only taking \"a quarter of the tablet.\"  She was also taking metoprolol ER 25mg once per day however has been out of it.  Has not tired any other medications for blood pressure in the past that she recalls.    5. RISHI (generalized anxiety disorder)  Has not taken any medications for " approximally 1 month for this.   States she was on fluoxetine 40 mg daily and the Wellbutrin 300mg XL daily.  She states that sometimes she will feel short of breath when she gets anxious.       6. Obesity (BMI 35.0-39.9 without comorbidity)  Does not exercise.    7. Encounter for hepatitis C screening test for low risk patient  Due for screening.    8. Screening for cardiovascular condition  Due for lipid panel.    9. Screening for malignant neoplasm of breast  Due for mammogram.    10. Encounter to establish care         ROS:  Denies any Headache, Blurred Vision, Confusion, Chest pain,  Shortness of breath,  Abdominal pain, Changes of bowel or bladder, Hematuria, Hematochezia, Lower ext. edema, Fevers, Nights sweats, Weight Changes, Focal weakness or numbness.  And all other systems are negative.  Anxiety, short of breath, shoulder pain      Current Outpatient Medications:   •  budesonide-formoterol (SYMBICORT) 80-4.5 MCG/ACT Aerosol, Inhale 2 Puffs by mouth 2 Times a Day., Disp: 1 Inhaler, Rfl: 6  •  buPROPion (WELLBUTRIN XL) 300 MG XL tablet, Take 1 Tab by mouth every morning., Disp: 30 Tab, Rfl: 6  •  FLUoxetine (PROZAC) 20 MG Cap, Take 2 Caps by mouth every day., Disp: 60 Cap, Rfl: 6  •  albuterol (VENTOLIN HFA) 108 (90 Base) MCG/ACT Aero Soln inhalation aerosol, Inhale 1-2 Puffs by mouth every 6 hours as needed for Shortness of Breath., Disp: 1 Inhaler, Rfl: 6  •  Tiotropium Bromide Monohydrate (SPIRIVA RESPIMAT) 2.5 MCG/ACT Aero Soln, Take 2 Puffs by mouth every day., Disp: 4 g, Rfl: 6  •  chlorthalidone (HYGROTON) 25 MG Tab, Take 0.5 Tabs by mouth every day., Disp: 30 Tab, Rfl: 3  •  metoprolol SR (TOPROL XL) 25 MG TABLET SR 24 HR, Take 1 Tab by mouth every day., Disp: 30 Tab, Rfl: 6  •  ibuprofen (MOTRIN) 800 MG Tab, Take 1 Tab by mouth every 8 hours as needed for Moderate Pain or Inflammation., Disp: 90 Tab, Rfl: 2  •  cyclobenzaprine (FLEXERIL) 10 MG Tab, TAKE 1 TABLET ORALLY 3 TIMES DAILY IF NEEDED FOR  PAIN (Patient not taking: Reported on 10/10/2019), Disp: 60 Tab, Rfl: 0    No Known Allergies    Past Medical History:   Diagnosis Date   • Anxiety    • Back pain    • Chronic use of opiate drugs therapeutic purposes 2017   • COPD (chronic obstructive pulmonary disease) ([facility])    • Depression    • Encounter for long-term (current) use of other medications    • Hypertension    • Migraine      Past Surgical History:   Procedure Laterality Date   • ABDOMINAL HYSTERECTOMY TOTAL  age 31    partial hysterectomy    • TONSILLECTOMY     • VEIN STRIPPING       Family History   Problem Relation Age of Onset   • Cancer Mother         stomach   • Diabetes Father    • Cancer Father         prostate   • Diabetes Paternal Grandmother    • Heart Disease Paternal Grandmother    • No Known Problems Maternal Grandmother    • No Known Problems Maternal Grandfather    • No Known Problems Paternal Grandfather    • Diabetes Brother    • No Known Problems Sister    • Stroke Neg Hx    • Hypertension Neg Hx    • Hyperlipidemia Neg Hx      Social History     Socioeconomic History   • Marital status: Single     Spouse name: Not on file   • Number of children: Not on file   • Years of education: Not on file   • Highest education level: Not on file   Occupational History   • Not on file   Social Needs   • Financial resource strain: Not on file   • Food insecurity:     Worry: Not on file     Inability: Not on file   • Transportation needs:     Medical: Not on file     Non-medical: Not on file   Tobacco Use   • Smoking status: Former Smoker     Packs/day: 0.25     Types: Cigarettes     Last attempt to quit: 2018     Years since quittin.7   • Smokeless tobacco: Never Used   Substance and Sexual Activity   • Alcohol use: Not Currently     Alcohol/week: 0.0 oz   • Drug use: No   • Sexual activity: Yes     Partners: Male     Birth control/protection: Surgical   Lifestyle   • Physical activity:     Days per week: Not on file     Minutes per  "session: Not on file   • Stress: Not on file   Relationships   • Social connections:     Talks on phone: Not on file     Gets together: Not on file     Attends Yazidi service: Not on file     Active member of club or organization: Not on file     Attends meetings of clubs or organizations: Not on file     Relationship status: Not on file   • Intimate partner violence:     Fear of current or ex partner: Not on file     Emotionally abused: Not on file     Physically abused: Not on file     Forced sexual activity: Not on file   Other Topics Concern   • Not on file   Social History Narrative   • Not on file       Objective:     Vitals: /80   Pulse (!) 109   Temp 36.4 °C (97.5 °F)   Resp 16   Ht 1.626 m (5' 4\")   Wt 95.3 kg (210 lb)   LMP 2007   SpO2 93%   BMI 36.05 kg/m²      General: Alert, pleasant, NAD, somewhat anxious or jittery/restless  HEENT:  Normocephalic.  Neck supple.  No thyromegaly or masses palpated. No cervical or supraclavicular lymphadenopathy.  Heart:  Slightly tachycardic. S1 and S2 normal.  No murmurs appreciated.    Respiratory:  Normal respiratory effort.  Clear to auscultation bilaterally.    Skin:  Warm, dry, no rashes  Musculoskeletal:  Gait is normal.  Right shoulder with tenderness along supraspinous.  No swelling, no crepitus, no bruising.  Passive and active range of motion appropriate.  Extremities:  . No leg edema.  Neurological: No tremors,   Psych:  Affect/mood is normal, judgement is good, memory is intact, grooming is appropriate.      Assessment and Plan.     64 y.o. female to establish care and discuss the followin. Right shoulder tendinitis  Strongly recommend physical therapy.  Patient has already been referred over to sports medicine.  Patient has declined.  Has asked for narcotics.  Last time she had narcotics per [facility] check was in 2018.  During review of her blood work there was a positive urine drug screen for methamphetamines.  Patient " states this is an error.  Informed patient that I will not be prescribing narcotics on her first visit and it is not appropriate at this time for her to be taking for this particular issue.  She may use ibuprofen as needed with food.  She may also do heat, ice, stretching and again physical therapy.  - ibuprofen (MOTRIN) 800 MG Tab; Take 1 Tab by mouth every 8 hours as needed for Moderate Pain or Inflammation.  Dispense: 90 Tab; Refill: 2    2. Mild episode of recurrent major depressive disorder (HCC)  Will restart patient back on her bupropion as she has been out of it.  Denies any suicidal thoughts or ideations.  - buPROPion (WELLBUTRIN XL) 300 MG XL tablet; Take 1 Tab by mouth every morning.  Dispense: 30 Tab; Refill: 6    3. Chronic obstructive pulmonary disease, unspecified COPD type (HCC)  Will trial his Symbicort as patient states she is been using a rescue inhaler daily.  Continue with Spiriva.  Pulmonary function test ordered.  - PULMONARY FUNCTION TESTS -Test requested: Complete Pulmonary Function Test; Future  - budesonide-formoterol (SYMBICORT) 80-4.5 MCG/ACT Aerosol; Inhale 2 Puffs by mouth 2 Times a Day.  Dispense: 1 Inhaler; Refill: 6  - albuterol (VENTOLIN HFA) 108 (90 Base) MCG/ACT Aero Soln inhalation aerosol; Inhale 1-2 Puffs by mouth every 6 hours as needed for Shortness of Breath.  Dispense: 1 Inhaler; Refill: 6  - Tiotropium Bromide Monohydrate (SPIRIVA RESPIMAT) 2.5 MCG/ACT Aero Soln; Take 2 Puffs by mouth every day.  Dispense: 4 g; Refill: 6    4. Essential hypertension  Stable.  Heart rate at 109.  Will restart metoprolol.  Denies any chest pain or palpitations or sensation of her heart racing.  Will start on chlorthalidone.  Discontinue Diuril.  Follow-up labs.  - CBC WITHOUT DIFFERENTIAL; Future  - Comp Metabolic Panel; Future  - TSH WITH REFLEX TO FT4; Future  - MICROALBUMIN CREAT RATIO URINE; Future  - chlorthalidone (HYGROTON) 25 MG Tab; Take 0.5 Tabs by mouth every day.  Dispense: 30  Tab; Refill: 3  - metoprolol SR (TOPROL XL) 25 MG TABLET SR 24 HR; Take 1 Tab by mouth every day.  Dispense: 30 Tab; Refill: 6    5. RISHI (generalized anxiety disorder)  Refills provided.  Stable on fluoxetine.  - FLUoxetine (PROZAC) 20 MG Cap; Take 2 Caps by mouth every day.  Dispense: 60 Cap; Refill: 6    6. Obesity (BMI 35.0-39.9 without comorbidity)  Chronic. Patient encouraged to reduce excess calorie consumption. Encouraged regular exercise. Discussed long term sequelae of obesity.  - Patient identified as having weight management issue.  Appropriate orders and counseling given.    7. Encounter for hepatitis C screening test for low risk patient  - HEP C VIRUS ANTIBODY; Future    8. Screening for cardiovascular condition  - Lipid Profile; Future    9. Screening for malignant neoplasm of breast  - MA-SCREENING MAMMO BILAT W/TOMOSYNTHESIS W/CAD; Future    10. Encounter to establish care  Reviewing old records.      Return in about 4 weeks (around 11/7/2019) for Lab results, Long (40 min).          Sofie GLASS

## 2019-10-10 NOTE — PATIENT INSTRUCTIONS
Budesonide; Formoterol Inhalation  What is this medicine?  BUDESONIDE; FORMOTEROL (byoo JULIO C oh nide; [place] [facility] [place]) inhalation is a combination of 2 medicines that decrease inflammation and help to open up the airways in your lungs. It is used to treat asthma. It is also used to treat chronic obstructive pulmonary disease (COPD), including chronic bronchitis or emphysema. Do NOT use for an acute asthma or COPD attack.  This medicine may be used for other purposes; ask your health care provider or pharmacist if you have questions.  COMMON BRAND NAME(S): Symbicort  What should I tell my health care provider before I take this medicine?  They need to know if you have any of these conditions:  -bone problems  -diabetes  -heart disease or irregular heartbeat  -high blood pressure  -immune system problems  -infection  -liver disease  -worsening asthma  -an unusual or allergic reaction to budesonide, formoterol, medicines, foods, dyes, or preservatives  -pregnant or trying to get pregnant  -breast-feeding  How should I use this medicine?  This medicine is inhaled through the mouth. Rinse your mouth with water after use. Make sure not to swallow the water. Follow the directions on your prescription label. Do not use more often than directed. Do not stop taking except on your doctor's advice. Make sure that you are using your inhaler correctly. Ask your doctor or health care provider if you have any questions.  A special MedGuide will be given to you by the pharmacist with each prescription and refill. Be sure to read this information carefully each time.  Talk to your pediatrician regarding the use of this medicine in children. While this drug may be prescribed for children as young as 6 years of age for selected conditions, precautions do apply.  Overdosage: If you think you have taken too much of this medicine contact a poison control center or emergency room at once.  NOTE: This medicine is only for you. Do not share  this medicine with others.  What if I miss a dose?  If you miss a dose, use it as soon as you remember. If it is almost time for your next dose, use only that dose and continue with your regular schedule, spacing doses evenly. Do not use double or extra doses.  What may interact with this medicine?  Do not take this medicine with any of the following medications:  -MAOIs like Carbex, Eldepryl, Marplan, Nardil, and Parnate  -mifepristone  -probucol  -procarbazine  -some other medicines for asthma like formoterol, salmeterol  This medicine may also interact with the following medications:  -antibiotics like clarithromycin, erythromycin  -cimetidine  -diuretics  -grapefruit juice  -itraconazole  -ketoconazole  -medicines for depression, anxiety, or psychotic disturbances  -medicines for irregular heartbeat  -methadone  -some heart medicines like atenolol, metoprolol  -some other medicines for breathing problems  -some vaccines  This list may not describe all possible interactions. Give your health care provider a list of all the medicines, herbs, non-prescription drugs, or dietary supplements you use. Also tell them if you smoke, drink alcohol, or use illegal drugs. Some items may interact with your medicine.  What should I watch for while using this medicine?  Tell your doctor or health care professional if your symptoms do not improve or get worse. If you need to use your short-acting inhalers more often, call your doctor right away. Do not use more than every 12 hours.  If you have asthma, be aware that using this medicine may increase your risk of dying from asthma related problems. Talk to your doctor about the risks and benefits of taking this medicine. NEVER use this medicine for an acute asthma attack.  This medicine may increase your risk of getting an infection. Tell your doctor or health care professional if you are around anyone with measles or chickenpox, or if you develop sores or blisters that do not  heal properly.  What side effects may I notice from receiving this medicine?  Side effects that you should report to your doctor or health care professional as soon as possible:  -allergic reactions such as skin rash or itching, hives, swelling of the face, lips or tongue  -breathing problems  -changes in vision  -chest pain  -fast, irregular heartbeat  -feeling faint or lightheaded, falls  -fever  -high blood pressure  -nervousness  -tremors  -white patches or sores in mouth  Side effects that usually do not require medical attention (report to your doctor or health care professional if they continue or are bothersome):  -cough  -different taste in mouth  -headache  -sore throat  -stuffy nose  -stomach upset  This list may not describe all possible side effects. Call your doctor for medical advice about side effects. You may report side effects to FDA at 6-738-FDA-1369.  Where should I keep my medicine?  Keep out of the reach of children.  Store in a dry place at room temperature between 20 and 25 degrees C (68 and 77 degrees F). Do not get the inhaler wet. Keep track of the number of doses used. Throw away the inhaler after using the marked number of inhalations or after the expiration date, whichever comes first. Do not burn or puncture canister.  NOTE: This sheet is a summary. It may not cover all possible information. If you have questions about this medicine, talk to your doctor, pharmacist, or health care provider.  © 2018 [employer] (2017-04-06 11:21:50)

## 2019-10-11 PROBLEM — Z79.891 CHRONIC USE OF OPIATE DRUGS THERAPEUTIC PURPOSES: Status: RESOLVED | Noted: 2017-02-17 | Resolved: 2019-10-11

## 2019-10-11 PROBLEM — M77.8 RIGHT SHOULDER TENDINITIS: Status: ACTIVE | Noted: 2019-10-11

## 2019-10-11 PROBLEM — E66.9 OBESITY (BMI 35.0-39.9 WITHOUT COMORBIDITY): Chronic | Status: ACTIVE | Noted: 2019-10-10

## 2019-10-11 PROBLEM — F41.1 GAD (GENERALIZED ANXIETY DISORDER): Status: ACTIVE | Noted: 2019-10-11

## 2019-10-11 PROBLEM — F33.0 MILD EPISODE OF RECURRENT MAJOR DEPRESSIVE DISORDER (HCC): Status: ACTIVE | Noted: 2019-10-11

## 2020-04-07 DIAGNOSIS — F33.0 MILD EPISODE OF RECURRENT MAJOR DEPRESSIVE DISORDER (HCC): ICD-10-CM

## 2020-04-07 RX ORDER — BUPROPION HYDROCHLORIDE 300 MG/1
300 TABLET ORAL EVERY MORNING
Qty: 90 TAB | Refills: 1 | Status: SHIPPED | OUTPATIENT
Start: 2020-04-07 | End: 2020-11-30

## 2020-06-11 DIAGNOSIS — I10 ESSENTIAL HYPERTENSION: ICD-10-CM

## 2020-06-12 RX ORDER — METOPROLOL SUCCINATE 25 MG/1
TABLET, EXTENDED RELEASE ORAL
Qty: 90 TAB | Refills: 1 | Status: SHIPPED | OUTPATIENT
Start: 2020-06-12 | End: 2020-11-30

## 2021-01-06 DIAGNOSIS — M77.8 RIGHT SHOULDER TENDINITIS: ICD-10-CM

## 2021-01-06 DIAGNOSIS — I10 ESSENTIAL HYPERTENSION: ICD-10-CM

## 2021-01-06 NOTE — TELEPHONE ENCOUNTER
Received request via: Pharmacy    Was the patient seen in the last year in this department? No (already warned of needed appointment)    Does the patient have an active prescription (recently filled or refills available) for medication(s) requested? No

## 2021-01-07 RX ORDER — IBUPROFEN 800 MG/1
800 TABLET ORAL EVERY 8 HOURS PRN
Qty: 90 TAB | Refills: 0 | Status: SHIPPED | OUTPATIENT
Start: 2021-01-07 | End: 2021-10-22

## 2021-01-07 RX ORDER — METOPROLOL SUCCINATE 25 MG/1
TABLET, EXTENDED RELEASE ORAL
Qty: 90 TAB | Refills: 0 | Status: SHIPPED | OUTPATIENT
Start: 2021-01-07 | End: 2021-03-22

## 2021-03-25 ENCOUNTER — TELEPHONE (OUTPATIENT)
Dept: MEDICAL GROUP | Facility: MEDICAL CENTER | Age: 66
End: 2021-03-25

## 2021-03-25 DIAGNOSIS — J44.9 CHRONIC OBSTRUCTIVE PULMONARY DISEASE, UNSPECIFIED COPD TYPE (HCC): ICD-10-CM

## 2021-03-25 NOTE — TELEPHONE ENCOUNTER
Pt called and left vm stating that she would like a refill on her Spiriva but she would like to know if she can get the powder type instead of the inh. If appropriate. Pt has an appt with PCP soon on 3/29/2021. Please advise.

## 2021-03-26 RX ORDER — TIOTROPIUM BROMIDE 18 UG/1
18 CAPSULE ORAL; RESPIRATORY (INHALATION) DAILY
Qty: 30 CAPSULE | Refills: 3 | Status: SHIPPED | OUTPATIENT
Start: 2021-03-26 | End: 2021-06-08 | Stop reason: SDUPTHER

## 2021-03-29 ENCOUNTER — NURSE TRIAGE (OUTPATIENT)
Dept: HEALTH INFORMATION MANAGEMENT | Facility: OTHER | Age: 66
End: 2021-03-29

## 2021-03-29 ENCOUNTER — OFFICE VISIT (OUTPATIENT)
Dept: MEDICAL GROUP | Facility: MEDICAL CENTER | Age: 66
End: 2021-03-29
Payer: COMMERCIAL

## 2021-03-29 VITALS
TEMPERATURE: 98.1 F | HEART RATE: 80 BPM | HEIGHT: 64 IN | DIASTOLIC BLOOD PRESSURE: 82 MMHG | SYSTOLIC BLOOD PRESSURE: 132 MMHG | BODY MASS INDEX: 39.14 KG/M2 | WEIGHT: 229.28 LBS | OXYGEN SATURATION: 94 %

## 2021-03-29 DIAGNOSIS — I10 ESSENTIAL HYPERTENSION: ICD-10-CM

## 2021-03-29 DIAGNOSIS — Z12.31 ENCOUNTER FOR SCREENING MAMMOGRAM FOR BREAST CANCER: ICD-10-CM

## 2021-03-29 DIAGNOSIS — R06.83 SNORING: ICD-10-CM

## 2021-03-29 DIAGNOSIS — F33.0 MILD EPISODE OF RECURRENT MAJOR DEPRESSIVE DISORDER (HCC): Chronic | ICD-10-CM

## 2021-03-29 DIAGNOSIS — J44.9 CHRONIC OBSTRUCTIVE PULMONARY DISEASE, UNSPECIFIED COPD TYPE (HCC): Chronic | ICD-10-CM

## 2021-03-29 DIAGNOSIS — E66.9 OBESITY (BMI 35.0-39.9 WITHOUT COMORBIDITY): Chronic | ICD-10-CM

## 2021-03-29 DIAGNOSIS — Z11.59 ENCOUNTER FOR HEPATITIS C SCREENING TEST FOR LOW RISK PATIENT: ICD-10-CM

## 2021-03-29 DIAGNOSIS — E78.5 DYSLIPIDEMIA: ICD-10-CM

## 2021-03-29 DIAGNOSIS — I10 ESSENTIAL HYPERTENSION: Chronic | ICD-10-CM

## 2021-03-29 DIAGNOSIS — F33.0 MILD EPISODE OF RECURRENT MAJOR DEPRESSIVE DISORDER (HCC): ICD-10-CM

## 2021-03-29 PROBLEM — M77.8 RIGHT SHOULDER TENDINITIS: Status: RESOLVED | Noted: 2019-10-11 | Resolved: 2021-03-29

## 2021-03-29 PROBLEM — F41.1 GENERALIZED ANXIETY DISORDER: Chronic | Status: ACTIVE | Noted: 2019-10-11

## 2021-03-29 PROCEDURE — 99213 OFFICE O/P EST LOW 20 MIN: CPT | Performed by: NURSE PRACTITIONER

## 2021-03-29 ASSESSMENT — PATIENT HEALTH QUESTIONNAIRE - PHQ9
SUM OF ALL RESPONSES TO PHQ QUESTIONS 1-9: 18
5. POOR APPETITE OR OVEREATING: 0 - NOT AT ALL
CLINICAL INTERPRETATION OF PHQ2 SCORE: 6

## 2021-03-29 ASSESSMENT — ANXIETY QUESTIONNAIRES
6. BECOMING EASILY ANNOYED OR IRRITABLE: NEARLY EVERY DAY
2. NOT BEING ABLE TO STOP OR CONTROL WORRYING: NEARLY EVERY DAY
7. FEELING AFRAID AS IF SOMETHING AWFUL MIGHT HAPPEN: NEARLY EVERY DAY
1. FEELING NERVOUS, ANXIOUS, OR ON EDGE: NEARLY EVERY DAY
5. BEING SO RESTLESS THAT IT IS HARD TO SIT STILL: NEARLY EVERY DAY
GAD7 TOTAL SCORE: 21
3. WORRYING TOO MUCH ABOUT DIFFERENT THINGS: NEARLY EVERY DAY
4. TROUBLE RELAXING: NEARLY EVERY DAY

## 2021-03-29 NOTE — PROGRESS NOTES
Chief Complaint   Patient presents with   • Medication Management     Bupropion is not working   • Anxiety       Subjective:     HPI:     Susan Pastrana is a 65 y.o. female here to discuss the evaluation and management of:    Has not been seen since October 2019.    1. Chronic obstructive pulmonary disease, unspecified COPD type ([facility])  Reports compliance with her Spiriva, Symbicort and has albuterol rescue inhaler.  Recently had requested to have the Spiriva HandiHaler versus the Respimat as she feels that she is not getting all of the medication in the Respimat.    2. Snoring  Reports she has been told by her children that she does snore.  She also complains of having daytime somnolence.  Has never been tested for sleep apnea.    3. Mild episode of recurrent major depressive disorder ([facility])  Patient states that she is been taking the Wellbutrin however not been taking the fluoxetine.  States Wellbutrin is not effective for her depression.  She is somewhat confusing on what medication she has tried and what has been effective for her.  Would like to try different medication.    4. Essential hypertension  Reports compliance with chlorthalidone, metoprolol.  Denies CP, S OB    5. Obesity (BMI 35.0-39.9 without comorbidity) ([facility])  Chronic problem for the patient.    6. Dyslipidemia  Not on statin therapy.    7. Encounter for screening mammogram for breast cancer  Overdue for mammogram.        ROS:  Denies any Headache, Blurred Vision, Confusion, Chest pain,  Shortness of breath,  Abdominal pain, Changes of bowel or bladder, Lower ext edema, Fevers, Nights sweats, Weight Changes, Focal weakness or numbness.  And all other systems reviewed and are all negative. POSITIVE FOR see above        Current Outpatient Medications:   •  sertraline (ZOLOFT) 50 MG Tab, Take 1 tablet by mouth every day., Disp: 30 tablet, Rfl: 2  •  tiotropium (SPIRIVA HANDIHALER) 18 MCG Cap, Place 1 capsule into inhaler and inhale every day., Disp:  30 capsule, Rfl: 3  •  buPROPion (WELLBUTRIN XL) 300 MG XL tablet, TAKE 1 TABLET BY MOUTH EVERY DAY IN THE MORNING, Disp: 30 tablet, Rfl: 0  •  metoprolol SR (TOPROL XL) 25 MG TABLET SR 24 HR, TAKE 1 TABLET BY MOUTH EVERY DAY, Disp: 30 tablet, Rfl: 0  •  ibuprofen (MOTRIN) 800 MG Tab, TAKE 1 TAB BY MOUTH EVERY 8 HOURS AS NEEDED. TAKE WITH FOOD, Disp: 90 Tab, Rfl: 0  •  budesonide-formoterol (SYMBICORT) 80-4.5 MCG/ACT Aerosol, Inhale 2 Puffs by mouth 2 Times a Day., Disp: 1 Inhaler, Rfl: 6  •  albuterol (VENTOLIN HFA) 108 (90 Base) MCG/ACT Aero Soln inhalation aerosol, Inhale 1-2 Puffs by mouth every 6 hours as needed for Shortness of Breath., Disp: 1 Inhaler, Rfl: 6  •  chlorthalidone (HYGROTON) 25 MG Tab, Take 0.5 Tabs by mouth every day., Disp: 30 Tab, Rfl: 3    Allergies   Allergen Reactions   • Aspirin Unspecified       Past Medical History:   Diagnosis Date   • Anxiety    • Back pain    • Chronic use of opiate drugs therapeutic purposes 2/17/2017   • COPD (chronic obstructive pulmonary disease) (HCC)    • Depression    • Encounter for long-term (current) use of other medications    • Hypertension    • Migraine      Past Surgical History:   Procedure Laterality Date   • ABDOMINAL HYSTERECTOMY TOTAL  age 31    partial hysterectomy    • TONSILLECTOMY     • VEIN STRIPPING       Family History   Problem Relation Age of Onset   • Cancer Mother         stomach   • Diabetes Father    • Cancer Father         prostate   • Diabetes Paternal Grandmother    • Heart Disease Paternal Grandmother    • No Known Problems Maternal Grandmother    • No Known Problems Maternal Grandfather    • No Known Problems Paternal Grandfather    • Diabetes Brother    • No Known Problems Sister    • Stroke Neg Hx    • Hypertension Neg Hx    • Hyperlipidemia Neg Hx      Social History     Socioeconomic History   • Marital status: Single     Spouse name: Not on file   • Number of children: Not on file   • Years of education: Not on file   •  "Highest education level: Not on file   Occupational History   • Not on file   Tobacco Use   • Smoking status: Former Smoker     Packs/day: 0.25     Types: Cigarettes     Quit date: 01/2018     Years since quitting: 3.2   • Smokeless tobacco: Never Used   Substance and Sexual Activity   • Alcohol use: Not Currently     Alcohol/week: 0.0 oz   • Drug use: No   • Sexual activity: Yes     Partners: Male     Birth control/protection: Surgical   Other Topics Concern   • Not on file   Social History Narrative   • Not on file     Social Determinants of Health     Financial Resource Strain:    • Difficulty of Paying Living Expenses:    Food Insecurity:    • Worried About Running Out of Food in the Last Year:    • Ran Out of Food in the Last Year:    Transportation Needs:    • Lack of Transportation (Medical):    • Lack of Transportation (Non-Medical):    Physical Activity:    • Days of Exercise per Week:    • Minutes of Exercise per Session:    Stress:    • Feeling of Stress :    Social Connections:    • Frequency of Communication with Friends and Family:    • Frequency of Social Gatherings with Friends and Family:    • Attends [employer] Services:    • Active Member of Clubs or Organizations:    • Attends Club or Organization Meetings:    • Marital Status:    Intimate Partner Violence:    • Fear of Current or Ex-Partner:    • Emotionally Abused:    • Physically Abused:    • Sexually Abused:        Objective:     Vitals: /82 (BP Location: Right arm, Patient Position: Sitting, BP Cuff Size: Large adult)   Pulse 80   Temp 36.7 °C (98.1 °F) (Temporal)   Ht 1.626 m (5' 4\")   Wt 104 kg (229 lb 4.5 oz)   LMP 09/13/2007   SpO2 94%   BMI 39.36 kg/m²    General: Alert, pleasant, NAD  HEENT: Normocephalic.  Neck supple.  No thyromegaly or masses palpated. No cervical or supraclavicular lymphadenopathy.  Heart: Regular rate and rhythm.  S1 and S2 normal.  No murmurs appreciated.  Respiratory: Normal respiratory effort.  " Clear to auscultation bilaterally. No wheezing  Skin: Warm, dry, no rashes.  Extremities: No leg edema. No discoloration  Neurological: No tremors  Psych:  Affect/mood is normal, judgement is good, memory is forgetful grooming is appropriate.    Assessment/Plan:     Susan was seen today for medication management and anxiety.    Diagnoses and all orders for this visit:    Chronic obstructive pulmonary disease, unspecified COPD type (HCC)  Chronic.  Stable in the clinic.  No respiratory distress.  Due for PFTs.  Referral placed to pulmonology.  Continue with current regimen at this time.  -     REFERRAL TO PULMONARY AND SLEEP MEDICINE  -     REFERRAL TO PULMONARY AND SLEEP MEDICINE    Snoring  Concern for RACHAEL.  Referral placed to sleep studies.  -     REFERRAL TO PULMONARY AND SLEEP MEDICINE  -     REFERRAL TO PULMONARY AND SLEEP MEDICINE    Mild episode of recurrent major depressive disorder (HCC)  Not well controlled per patient.  We will have her start on sertraline and continue with Wellbutrin at this time.  She will follow back up in 6 weeks to reevaluate and at that point we will discuss weaning off of the Wellbutrin.  -     sertraline (ZOLOFT) 50 MG Tab; Take 1 tablet by mouth every day.    Essential hypertension  Blood pressure stable.  No chest pain, shortness of breath or leg swelling.  Recommend exercise, Dash diet and weight loss.  Continue with current regimen at this time.  Overdue for labs.  -     Comp Metabolic Panel; Future  -     MICROALBUMIN CREAT RATIO URINE; Future  -     TSH WITH REFLEX TO FT4; Future    Obesity (BMI 35.0-39.9 without comorbidity) (HCC)  Chronic. Patient encouraged to reduce excess calorie consumption. Encouraged regular exercise. Discussed long term sequelae of obesity.    Dyslipidemia  Need lipid panel.  -     Lipid Profile; Future    Encounter for screening mammogram for breast cancer  -     MA-SCREENING MAMMO BILAT W/TOMOSYNTHESIS W/CAD; Future    Encounter for hepatitis C  screening test for low risk patient  -     HEP C VIRUS ANTIBODY; Future      Return in about 6 weeks (around 5/10/2021).          Sofie SARKAR.

## 2021-03-30 RX ORDER — METOPROLOL SUCCINATE 25 MG/1
TABLET, EXTENDED RELEASE ORAL
Qty: 90 TABLET | Refills: 1 | Status: SHIPPED | OUTPATIENT
Start: 2021-03-30 | End: 2021-12-20

## 2021-03-30 RX ORDER — BUPROPION HYDROCHLORIDE 300 MG/1
TABLET ORAL
Qty: 90 TABLET | Refills: 1 | Status: SHIPPED | OUTPATIENT
Start: 2021-03-30 | End: 2021-10-22

## 2021-03-30 NOTE — TELEPHONE ENCOUNTER
Received request via: Pharmacy    Was the patient seen in the last year in this department? Yes    Does the patient have an active prescription (recently filled or refills available) for medication(s) requested? Request for a 90 day supply

## 2021-04-02 ENCOUNTER — TELEPHONE (OUTPATIENT)
Dept: MEDICAL GROUP | Facility: MEDICAL CENTER | Age: 66
End: 2021-04-02

## 2021-04-02 DIAGNOSIS — J44.9 CHRONIC OBSTRUCTIVE PULMONARY DISEASE, UNSPECIFIED COPD TYPE (HCC): ICD-10-CM

## 2021-04-02 DIAGNOSIS — J44.1 COPD EXACERBATION (HCC): ICD-10-CM

## 2021-04-02 RX ORDER — METHYLPREDNISOLONE 4 MG/1
TABLET ORAL
Qty: 21 TABLET | Refills: 0 | Status: SHIPPED | OUTPATIENT
Start: 2021-04-02

## 2021-04-02 RX ORDER — DOXYCYCLINE HYCLATE 100 MG
100 TABLET ORAL 2 TIMES DAILY
Qty: 20 TABLET | Refills: 0 | Status: SHIPPED | OUTPATIENT
Start: 2021-04-02

## 2021-04-02 NOTE — TELEPHONE ENCOUNTER
Patient was seen Monday the 29th and would like to get some antibiotics called in to help with her chest congestion.

## 2021-06-07 DIAGNOSIS — F33.0 MILD EPISODE OF RECURRENT MAJOR DEPRESSIVE DISORDER (HCC): Chronic | ICD-10-CM

## 2021-06-07 NOTE — TELEPHONE ENCOUNTER
Received a vm from pharm stating that the post office lost pts Rx Sertraline and since there are no refills left, pt will need a new Rx. Please advise.

## 2021-10-21 DIAGNOSIS — F33.0 MILD EPISODE OF RECURRENT MAJOR DEPRESSIVE DISORDER (HCC): ICD-10-CM

## 2021-10-21 DIAGNOSIS — M77.8 RIGHT SHOULDER TENDINITIS: ICD-10-CM

## 2021-10-21 NOTE — PROGRESS NOTES
"Subjective:      Susan Pastrana is a 63 y.o. female who presents with Back Pain            HPI 1. Chronic lumbar pain-patient reports that she is continuing to manage her low back pain which tends to generate more towards mid and later in the day. She is taking 1-1/2 tablets of Norco daily. Reports she is not using any marijuana or other prescription pain relievers. Denies any constipation, or current nausea. Notes intermittent unsteadiness.  2. Chronic hearing loss-patient notes difficulty with hearing affecting both ears over the past 2 years more so in the past 1 year. There's been no ear pain or ear discharge.  3. Obesity-patient denies sinus point her loss or cold intolerance  ROS negative for syncope, diarrhea. Notes intermittent nausea       Objective:     /80   Pulse 88   Temp 36.1 °C (97 °F)   Resp 16   Ht 1.626 m (5' 4.02\")   Wt 93 kg (205 lb)   LMP 09/13/2007   SpO2 91%   Breastfeeding? No   BMI 35.17 kg/m²      Physical Exam  Gen.- alert, cooperative, in no acute distress  Neck- midline trachea, thyroid not enlarged or tender,supple, no cervical adenopathy  Chest-clear to auscultation and percussion with normal breath sounds. No retractions. Chest wall nontender  Cardiac- regular rhythm and rate. No murmur, thrill, or heave  Ears-normal TMs and canals bilaterally          Assessment/Plan:     1. Screening for malignant neoplasm of breast    - MA-MAMMO SCREENING BILAT W/KAIT W/CAD; Future    2. Screening for malignant neoplasm of colon    - REFERRAL TO GASTROENTEROLOGY    3. Chronic low back pain without sciatica, unspecified back pain laterality     4. Obesity  - HYDROcodone/acetaminophen (NORCO)  MG Tab; TAKE 1 TABLET ORALLY EVERY 8 HOURS IF NEEDED  Dispense: 45 Tab; Refill: 0  - PAIN MANAGEMENT SCRN, W/ RFLX TO QNT; Future    Plan: 1. Update UDS today  2. Plan on renewing Norco 10/325, #45  3. Screening mammogram ordered  4. Screening colonoscopy referral sent  5. ENT referral  6. " Patient counseled to limit portions to achieve gradual weight loss   [[email or web address]] : 50 yo F with hx of panic attacks presenting for behavioral health concerns and sinusitis. She is undergoing a lot of stress with her family and household in general. She is tearful and does not know who she can talk to. She has 5 children ages 12-30. She has a new job but feels comfortable there.  [de-identified] : 48 yo F with hx of panic attacks presenting fro behavioral health concerns and sinusitis. She is undergoing a lot of stress with her family and household in general. She is tearful and does not know who she can talk to. She has 5 children ages 12-30. She has a new job but feels comfortable there.

## 2021-10-22 RX ORDER — BUPROPION HYDROCHLORIDE 300 MG/1
TABLET ORAL
Qty: 90 TABLET | Refills: 1 | Status: SHIPPED | OUTPATIENT
Start: 2021-10-22

## 2021-10-22 RX ORDER — IBUPROFEN 800 MG/1
TABLET ORAL
Qty: 90 TABLET | Refills: 0 | Status: SHIPPED | OUTPATIENT
Start: 2021-10-22 | End: 2021-12-20

## 2021-12-18 DIAGNOSIS — M77.8 RIGHT SHOULDER TENDINITIS: ICD-10-CM

## 2021-12-18 DIAGNOSIS — I10 ESSENTIAL HYPERTENSION: ICD-10-CM

## 2021-12-20 RX ORDER — TIOTROPIUM BROMIDE 18 UG/1
CAPSULE ORAL; RESPIRATORY (INHALATION)
Qty: 30 CAPSULE | Refills: 6 | Status: SHIPPED | OUTPATIENT
Start: 2021-12-20

## 2021-12-20 RX ORDER — IBUPROFEN 800 MG/1
TABLET ORAL
Qty: 90 TABLET | Refills: 0 | Status: SHIPPED | OUTPATIENT
Start: 2021-12-20 | End: 2022-01-16

## 2021-12-20 RX ORDER — METOPROLOL SUCCINATE 25 MG/1
TABLET, EXTENDED RELEASE ORAL
Qty: 90 TABLET | Refills: 1 | Status: SHIPPED | OUTPATIENT
Start: 2021-12-20

## 2022-01-14 DIAGNOSIS — M77.8 RIGHT SHOULDER TENDINITIS: ICD-10-CM

## 2022-01-16 RX ORDER — IBUPROFEN 800 MG/1
TABLET ORAL
Qty: 90 TABLET | Refills: 0 | Status: SHIPPED | OUTPATIENT
Start: 2022-01-16 | End: 2022-02-16

## 2022-02-16 DIAGNOSIS — M77.8 RIGHT SHOULDER TENDINITIS: ICD-10-CM

## 2022-02-16 RX ORDER — IBUPROFEN 800 MG/1
TABLET ORAL
Qty: 90 TABLET | Refills: 0 | Status: SHIPPED | OUTPATIENT
Start: 2022-02-16 | End: 2022-03-24

## 2022-03-24 DIAGNOSIS — M77.8 RIGHT SHOULDER TENDINITIS: ICD-10-CM

## 2022-03-25 RX ORDER — IBUPROFEN 800 MG/1
TABLET ORAL
Qty: 30 TABLET | Refills: 0 | Status: SHIPPED | OUTPATIENT
Start: 2022-03-25